# Patient Record
Sex: MALE | Race: BLACK OR AFRICAN AMERICAN | NOT HISPANIC OR LATINO | Employment: UNEMPLOYED | URBAN - METROPOLITAN AREA
[De-identification: names, ages, dates, MRNs, and addresses within clinical notes are randomized per-mention and may not be internally consistent; named-entity substitution may affect disease eponyms.]

---

## 2017-02-14 ENCOUNTER — TELEPHONE (OUTPATIENT)
Dept: TRANSPLANT | Facility: CLINIC | Age: 57
End: 2017-02-14

## 2017-02-15 ENCOUNTER — SOCIAL WORK (OUTPATIENT)
Dept: TRANSPLANT | Facility: CLINIC | Age: 57
End: 2017-02-15

## 2018-05-30 ENCOUNTER — TELEPHONE (OUTPATIENT)
Dept: TRANSPLANT | Facility: CLINIC | Age: 58
End: 2018-05-30

## 2018-05-30 NOTE — TELEPHONE ENCOUNTER
----- Message from Coco Eaton sent at 5/30/2018  4:37 PM CDT -----  We have the pt recorders and they are now pending review by the referral nurse. Also update his demo info  By:Coco Eaton

## 2018-06-01 ENCOUNTER — TELEPHONE (OUTPATIENT)
Dept: TRANSPLANT | Facility: CLINIC | Age: 58
End: 2018-06-01

## 2018-06-01 ENCOUNTER — DOCUMENTATION ONLY (OUTPATIENT)
Dept: TRANSPLANT | Facility: CLINIC | Age: 58
End: 2018-06-01

## 2018-06-01 NOTE — TELEPHONE ENCOUNTER
Initial referral received from Dr Stephen  Pt to be discussed on Monday at [facility] to determine HCV treatment vs TXP vs Med Management

## 2018-06-04 ENCOUNTER — TELEPHONE (OUTPATIENT)
Dept: TRANSPLANT | Facility: CLINIC | Age: 58
End: 2018-06-04

## 2018-06-04 NOTE — TELEPHONE ENCOUNTER
Initial referral received from Dr Stephen    Patient with HEP C Cirrhosis on dialysis with  MELD 21  Referred for LIVER AND KIDNEY  transplant for EVALUATION.    Referral completed and forwarded to [facility].      Insurance:     Medicare  [id number]    Medicaid  [id number]

## 2018-06-07 ENCOUNTER — TELEPHONE (OUTPATIENT)
Dept: TRANSPLANT | Facility: CLINIC | Age: 58
End: 2018-06-07

## 2018-06-07 NOTE — TELEPHONE ENCOUNTER
Call placed again LVM on pt phone number listed and this time his daughter phone [phone]. LVM on need to contact us asap for his appt.

## 2018-06-11 ENCOUNTER — TELEPHONE (OUTPATIENT)
Dept: TRANSPLANT | Facility: CLINIC | Age: 58
End: 2018-06-11

## 2018-06-11 DIAGNOSIS — B18.2 CHRONIC HEPATITIS C WITH CIRRHOSIS: Primary | ICD-10-CM

## 2018-06-11 DIAGNOSIS — K74.60 CHRONIC HEPATITIS C WITH CIRRHOSIS: Primary | ICD-10-CM

## 2018-06-11 DIAGNOSIS — Z76.82 ORGAN TRANSPLANT CANDIDATE: ICD-10-CM

## 2018-06-11 NOTE — TELEPHONE ENCOUNTER
Pt finally returned call re the appt scheduled for tomorrow. He stated he can not make it needing two days notice. And he can't make it MWF.  Message to Dr Fabian re plan for rescheduling. Pt agreed to appt on tuesday next week.

## 2018-06-18 RX ORDER — OMEPRAZOLE 20 MG/1
20 CAPSULE, DELAYED RELEASE ORAL DAILY
COMMUNITY
Start: 2017-12-21 | End: 2018-11-06 | Stop reason: SDUPTHER

## 2018-06-18 RX ORDER — AMLODIPINE BESYLATE 10 MG/1
10 TABLET ORAL DAILY
COMMUNITY
End: 2018-11-06 | Stop reason: SDUPTHER

## 2018-06-18 RX ORDER — CLOTRIMAZOLE AND BETAMETHASONE DIPROPIONATE 10; .64 MG/G; MG/G
CREAM TOPICAL
COMMUNITY
Start: 2018-05-28 | End: 2019-05-28

## 2018-06-18 RX ORDER — SILDENAFIL CITRATE 20 MG/1
10 TABLET ORAL DAILY PRN
COMMUNITY
Start: 2017-12-21 | End: 2018-11-06 | Stop reason: SDUPTHER

## 2018-06-18 RX ORDER — PRAVASTATIN SODIUM 20 MG/1
20 TABLET ORAL DAILY
COMMUNITY
Start: 2018-05-28 | End: 2018-11-06 | Stop reason: SDUPTHER

## 2018-06-18 RX ORDER — SEVELAMER HYDROCHLORIDE 800 MG/1
3200 TABLET, FILM COATED ORAL
COMMUNITY

## 2018-06-18 RX ORDER — ENALAPRIL MALEATE 20 MG/1
20 TABLET ORAL DAILY
COMMUNITY

## 2018-06-19 ENCOUNTER — LAB VISIT (OUTPATIENT)
Dept: LAB | Facility: HOSPITAL | Age: 58
End: 2018-06-19
Attending: INTERNAL MEDICINE
Payer: MEDICARE

## 2018-06-19 ENCOUNTER — OFFICE VISIT (OUTPATIENT)
Dept: TRANSPLANT | Facility: CLINIC | Age: 58
End: 2018-06-19
Attending: INTERNAL MEDICINE
Payer: MEDICARE

## 2018-06-19 VITALS
HEART RATE: 65 BPM | OXYGEN SATURATION: 96 % | TEMPERATURE: 98 F | DIASTOLIC BLOOD PRESSURE: 80 MMHG | SYSTOLIC BLOOD PRESSURE: 150 MMHG | BODY MASS INDEX: 27.73 KG/M2 | WEIGHT: 187.19 LBS | RESPIRATION RATE: 16 BRPM | HEIGHT: 69 IN

## 2018-06-19 DIAGNOSIS — R18.8 OTHER ASCITES: Chronic | ICD-10-CM

## 2018-06-19 DIAGNOSIS — K74.60 CHRONIC HEPATITIS C WITH CIRRHOSIS: ICD-10-CM

## 2018-06-19 DIAGNOSIS — Z76.82 ORGAN TRANSPLANT CANDIDATE: ICD-10-CM

## 2018-06-19 DIAGNOSIS — N18.6 ESRD (END STAGE RENAL DISEASE): Primary | ICD-10-CM

## 2018-06-19 DIAGNOSIS — B18.2 CHRONIC HEPATITIS C WITH CIRRHOSIS: ICD-10-CM

## 2018-06-19 LAB
A1 AG RBC QL: NORMAL
ABO + RH BLD: NORMAL
AFP SERPL-MCNC: 14 NG/ML
ALBUMIN SERPL BCP-MCNC: 2.7 G/DL
ALP SERPL-CCNC: 207 U/L
ALT SERPL W/O P-5'-P-CCNC: 36 U/L
ANION GAP SERPL CALC-SCNC: 11 MMOL/L
AST SERPL-CCNC: 63 U/L
BASOPHILS # BLD AUTO: 0.06 K/UL
BASOPHILS NFR BLD: 1 %
BILIRUB DIRECT SERPL-MCNC: 0.4 MG/DL
BILIRUB SERPL-MCNC: 0.8 MG/DL
BLD GP AB SCN CELLS X3 SERPL QL: NORMAL
BUN SERPL-MCNC: 43 MG/DL
CALCIUM SERPL-MCNC: 7.9 MG/DL
CHLORIDE SERPL-SCNC: 96 MMOL/L
CO2 SERPL-SCNC: 31 MMOL/L
CREAT SERPL-MCNC: 8.9 MG/DL
DIFFERENTIAL METHOD: ABNORMAL
EOSINOPHIL # BLD AUTO: 0.2 K/UL
EOSINOPHIL NFR BLD: 2.6 %
ERYTHROCYTE [DISTWIDTH] IN BLOOD BY AUTOMATED COUNT: 15 %
EST. GFR  (AFRICAN AMERICAN): 6.8 ML/MIN/1.73 M^2
EST. GFR  (NON AFRICAN AMERICAN): 5.9 ML/MIN/1.73 M^2
GGT SERPL-CCNC: 325 U/L
GLUCOSE SERPL-MCNC: 121 MG/DL
HCT VFR BLD AUTO: 33.5 %
HGB BLD-MCNC: 10.6 G/DL
IMM GRANULOCYTES # BLD AUTO: 0.03 K/UL
IMM GRANULOCYTES NFR BLD AUTO: 0.5 %
INR PPP: 1.1
LYMPHOCYTES # BLD AUTO: 0.9 K/UL
LYMPHOCYTES NFR BLD: 15 %
MCH RBC QN AUTO: 32 PG
MCHC RBC AUTO-ENTMCNC: 31.6 G/DL
MCV RBC AUTO: 101 FL
MONOCYTES # BLD AUTO: 0.9 K/UL
MONOCYTES NFR BLD: 15.1 %
NEUTROPHILS # BLD AUTO: 4 K/UL
NEUTROPHILS NFR BLD: 65.8 %
NRBC BLD-RTO: 0 /100 WBC
PLATELET # BLD AUTO: 157 K/UL
PMV BLD AUTO: 10.3 FL
POTASSIUM SERPL-SCNC: 4.4 MMOL/L
PROT SERPL-MCNC: 7.2 G/DL
PROTHROMBIN TIME: 11.6 SEC
RBC # BLD AUTO: 3.31 M/UL
SODIUM SERPL-SCNC: 138 MMOL/L
WBC # BLD AUTO: 6.14 K/UL

## 2018-06-19 PROCEDURE — 85610 PROTHROMBIN TIME: CPT | Mod: TXP

## 2018-06-19 PROCEDURE — 86905 BLOOD TYPING RBC ANTIGENS: CPT | Mod: TXP

## 2018-06-19 PROCEDURE — 36415 COLL VENOUS BLD VENIPUNCTURE: CPT | Mod: TXP

## 2018-06-19 PROCEDURE — 99999 PR PBB SHADOW E&M-EST. PATIENT-LVL III: CPT | Mod: PBBFAC,TXP,, | Performed by: INTERNAL MEDICINE

## 2018-06-19 PROCEDURE — 80053 COMPREHEN METABOLIC PANEL: CPT | Mod: TXP

## 2018-06-19 PROCEDURE — 99205 OFFICE O/P NEW HI 60 MIN: CPT | Mod: S$PBB,TXP,, | Performed by: INTERNAL MEDICINE

## 2018-06-19 PROCEDURE — 82105 ALPHA-FETOPROTEIN SERUM: CPT | Mod: TXP

## 2018-06-19 PROCEDURE — 82248 BILIRUBIN DIRECT: CPT | Mod: TXP

## 2018-06-19 PROCEDURE — 82977 ASSAY OF GGT: CPT | Mod: TXP

## 2018-06-19 PROCEDURE — 80321 ALCOHOLS BIOMARKERS 1OR 2: CPT | Mod: TXP

## 2018-06-19 PROCEDURE — 85025 COMPLETE CBC W/AUTO DIFF WBC: CPT | Mod: TXP

## 2018-06-19 PROCEDURE — 99213 OFFICE O/P EST LOW 20 MIN: CPT | Mod: PBBFAC,TXP,25 | Performed by: INTERNAL MEDICINE

## 2018-06-19 PROCEDURE — 86850 RBC ANTIBODY SCREEN: CPT | Mod: TXP

## 2018-06-19 RX ORDER — ERGOCALCIFEROL 1.25 MG/1
50000 CAPSULE ORAL
COMMUNITY

## 2018-06-19 NOTE — PROGRESS NOTES
Transplant Hepatology  Liver Transplant Recipient Evaluation      Consultation started: 6/19/2018 at 9:41 AM     Original Referring Provider: Luciana Stephen  Current Corresponding Physician: Luciana HERNANDEZ Native Liver Diagnosis: Cirrhosis: Type C    Reason for Visit: evaluation for liver transplant     Subjective:     Corbin Tavarez is a 57 y.o. male with ESLD secondary to chronic hepatitis C and well ESRD due to hypertension.  I had the pleasure of seeing Corbin Tavarez today for consideration of a liver   transplant.  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  He also has end-stage renal disease due to hypertension.  He has   been on dialysis for the past 9 years.  He had also been incarcerated for 21   years and has been out of residential since 2016.  He was treated about two decades   ago with interferon based antiviral therapy, but failed.  He has developed   ascites and esophageal varices.  He has no encephalopathy.  He has not required   paracenteses.  He states that there was an episode of congestive heart failure   that may have been due to volume overload.  He also describes what may have been   a cardiac stent procedure done about six months ago.  He lives at home with his   nephew.  He also states that his son could be a caregiver.      NG/HN  dd: 06/19/2018 09:47:47 (CDT)  td: 06/20/2018 00:46:27 (CDT)  Doc ID   #[id number]  Job ID #[id number]    CC:       Review of Systems   Constitutional: Negative for activity change, appetite change, chills, fatigue and unexpected weight change.   HENT: Negative for congestion, facial swelling and tinnitus.    Eyes: Negative for visual disturbance.   Respiratory: Negative for cough, shortness of breath and wheezing.    Cardiovascular: Positive for leg swelling. Negative for chest pain and palpitations.   Gastrointestinal: Negative for abdominal  distention.   Genitourinary: Negative for dysuria.   Musculoskeletal: Negative for arthralgias, joint swelling and myalgias.   Neurological: Negative for syncope and headaches.   Hematological: Does not bruise/bleed easily.   Psychiatric/Behavioral: Negative for confusion.       Objective:   Physical Exam   Constitutional: He is oriented to person, place, and time. He appears well-developed and well-nourished.   Eyes: No scleral icterus.   Cardiovascular: Normal rate, regular rhythm and normal heart sounds.    Pulmonary/Chest: Effort normal and breath sounds normal. No respiratory distress. He has no wheezes.   Abdominal: Soft. Bowel sounds are normal. He exhibits no distension and no mass. There is no tenderness. There is no rebound.   Musculoskeletal: Normal range of motion.   Lymphadenopathy:     He has no cervical adenopathy.   Neurological: He is alert and oriented to person, place, and time.   Skin: Skin is warm and dry.     Computed MELD-Na score unavailable. Necessary lab results were not found in the last year.  Computed MELD score unavailable. Necessary lab results were not found in the last year.  No results found for: GLU, BUN, CREATININE, CALCIUM, NA, K, CL, PROT, CO2, WBC, RBC, HGB, HCT, PLT  No results found for: BOONE, BNP, CHOL, TRIG, HDL, CHOLHDL, TOTALCHOLEST, ALBUMIN, BILITOT, AST, ALT, ALKPHOS, MG, LABPROT, INR, PSA, QUANTIFERON    Diagnostics:     1. ESRD (end stage renal disease)    2. Chronic hepatitis C with cirrhosis    3. Organ transplant candidate    4. Other ascites        Transplant Hepatology - Candidacy   Assessment/Plan:     Transplant Candidacy: Corbin Tavarez is a 57 y.o. male with ESLD secondary to hepatitis C here for evaluation for possible OLT.  In my opinion, he is a good candidate for liver transplant. He will need to see cardiology for cardiac risk stratification. He will need to be assessed by renal transplant for possible combined liver/kidney transplant. He will be  presented to selection committee after all tests and evaluations are complete. Would recommend deferring possible HCV treatment until after a decision is made about transplantation to allow potential access to wider donor pool.    Rivera Ca MD         [facility] Patient Status  Functional Status: 90% - Able to carry on normal activity: minor symptoms of disease  Physical Capacity: No Limitations    Outside Records Request:

## 2018-06-19 NOTE — LETTER
June 19, 2018        Luciana Stephen  [address]  [address]  [place] [address]  Phone: [phone]  Fax: [phone]             [place] [facility]  [address]  [facility] [address]  Phone: [phone]   Patient: Corbin Tavarez   MR Number: [id number]   YOB: 1960   Date of Visit: 6/19/2018       Dear Dr. Luciana Stephen    Thank you for referring Corbin Taavrez to me for evaluation. Attached you will find relevant portions of my assessment and plan of care.    If you have questions, please do not hesitate to call me. I look forward to following Corbin Tavarez along with you.    Sincerely,    Rivera Ca MD    Enclosure    If you would like to receive this communication electronically, please contact [email or web address] or [phone] to request Jascha Link access.    Jascha Link is a tool which provides read-only access to select patient information with whom you have a relationship. Its easy to use and provides real time access to review your patients record including encounter summaries, notes, results, and demographic information.    If you feel you have received this communication in error or would no longer like to receive these types of communications, please e-mail [email or web address]

## 2018-06-22 LAB — PHOSPHATIDYLETHANOL (PETH): NEGATIVE NG/ML

## 2018-07-17 ENCOUNTER — TELEPHONE (OUTPATIENT)
Dept: TRANSPLANT | Facility: CLINIC | Age: 58
End: 2018-07-17

## 2018-07-17 DIAGNOSIS — B18.2 CHRONIC HEPATITIS C WITH CIRRHOSIS: ICD-10-CM

## 2018-07-17 DIAGNOSIS — Z76.82 ORGAN TRANSPLANT CANDIDATE: Primary | ICD-10-CM

## 2018-07-17 DIAGNOSIS — N18.6 ESRD (END STAGE RENAL DISEASE): ICD-10-CM

## 2018-07-17 DIAGNOSIS — K73.2 CHRONIC ACTIVE HEPATITIS: ICD-10-CM

## 2018-07-17 DIAGNOSIS — R18.8 OTHER ASCITES: Chronic | ICD-10-CM

## 2018-07-17 DIAGNOSIS — K74.60 CHRONIC HEPATITIS C WITH CIRRHOSIS: ICD-10-CM

## 2018-07-17 NOTE — TELEPHONE ENCOUNTER
Pt returned my call. We discussed his need per Dr. Ca for a combined liver & kidney evaluation. Pt would like to schedule a standard evaluation the week of 7/30. This would give him enough time to arrange transportation to main campus for all of his appts. Orders entered for SLK evaluation, plus a cardiology consult per Dr. Ca. Pt states that he had endoscopic procedures at [facility]. Coordinator will reach out to them for records. Understanding expressed. No other questions at this time.

## 2018-07-17 NOTE — PROGRESS NOTES
Orders entered for evaluation. Pt returned call to schedule a standard liver evaluation the week of 7/30.

## 2018-07-23 ENCOUNTER — TELEPHONE (OUTPATIENT)
Dept: TRANSPLANT | Facility: CLINIC | Age: 58
End: 2018-07-23

## 2018-07-23 NOTE — TELEPHONE ENCOUNTER
Returned call to patient. He stated that he received a call from  Friday to notify him of his appointments, but this was not enough advanced notice for him to schedule transportation. He is available next week and the week after. Spoke to Mikhail about rescheduling for 2nd week of August. Pt understands & has no other questions at this time. He is currently in dialysis and available for phone calls.

## 2018-07-23 NOTE — TELEPHONE ENCOUNTER
----- Message from Veronika Mcneill sent at 7/23/2018 10:32 AM CDT -----  Contact: pt  Patient Returning Call from [facility]    Who Left Message for Patient:  Communication Preference: [phone]  Additional Information: n/a

## 2018-07-23 NOTE — TELEPHONE ENCOUNTER
Returned call to patient to reschedule eval. No answer. Left VM requesting call back with requested dates of eval.

## 2018-07-23 NOTE — TELEPHONE ENCOUNTER
----- Message from Coco Eaton sent at 7/23/2018  9:59 AM CDT -----  Pt wants to re/nba work up appts  ----- Message -----  From: Kerry Corral  Sent: 7/23/2018   9:15 AM  To: [facility] Pre-Liver Transplant Non-Clinical    Patient Requesting Sooner Appointment.     Reason for sooner appt.:  When is the first available appointment?  Communication Preference: [phone]  Additional Information: needs to resched to next available appt

## 2018-07-31 ENCOUNTER — HOSPITAL ENCOUNTER (OUTPATIENT)
Dept: RADIOLOGY | Facility: HOSPITAL | Age: 58
Discharge: HOME | End: 2018-07-31
Attending: INTERNAL MEDICINE
Payer: MEDICARE

## 2018-07-31 ENCOUNTER — SOCIAL WORK (OUTPATIENT)
Dept: TRANSPLANT | Facility: CLINIC | Age: 58
End: 2018-07-31
Attending: INTERNAL MEDICINE
Payer: MEDICARE

## 2018-07-31 DIAGNOSIS — Z76.82 ORGAN TRANSPLANT CANDIDATE: ICD-10-CM

## 2018-07-31 DIAGNOSIS — R18.8 OTHER ASCITES: Chronic | ICD-10-CM

## 2018-07-31 DIAGNOSIS — N18.6 ESRD (END STAGE RENAL DISEASE): ICD-10-CM

## 2018-07-31 DIAGNOSIS — K73.2 CHRONIC ACTIVE HEPATITIS: ICD-10-CM

## 2018-07-31 DIAGNOSIS — K74.60 CHRONIC HEPATITIS C WITH CIRRHOSIS: ICD-10-CM

## 2018-07-31 DIAGNOSIS — B18.2 CHRONIC HEPATITIS C WITH CIRRHOSIS: ICD-10-CM

## 2018-07-31 PROCEDURE — 71046 X-RAY EXAM CHEST 2 VIEWS: CPT | Mod: TC,FY,TXP

## 2018-07-31 PROCEDURE — 71046 X-RAY EXAM CHEST 2 VIEWS: CPT | Mod: 26,TXP,, | Performed by: RADIOLOGY

## 2018-07-31 NOTE — PROGRESS NOTES
Transplant Recipient Adult Psychosocial Assessment\    Patient did not present to this appointment with caregivers. Patient was informed that he would need to present for another scheduled appointment with caregivers.     Corbin Daysi  [address] [address]    Telephone Information:   Mobile [phone]   Mobile [phone]   Home  [phone] (home)  Work  There is no work phone number on file.  E-mail  No e-mail address on record    Sex: male  YOB: 1960  Age: 57 y.o.    Encounter Date: 2018  [place] Citizen: yes  Primary Language: English   Needed: no    Emergency Contact:  Name: Moncho Nicholas (36 yrs old)  Relationship: daughter  Address: exact address unknown;  Daughter lives in [place], [place]  Phone Numbers:  [phone] (mobile)    Family/Social Support:   Number of dependents/: none  Marital history: patient reportedly still legally , however patient never . Patient  in   Other family dynamics: Patient parents are . Patient reported that he gets along well with the mothers of his children. Patient reported having eight different mothers of his 16 children.       Household Composition:  Name: Jimbo Valiente   Age: 53  Relationship: friend  Does person drive? no     Patient reported that Jimbo Valiente is the patient's roommate. Per patient, Jimbo is a heavy substance abuser that he lives with. Patient reported that he has created a nice environment for him and his roommate. The patient reports that he sleeps in the living room of the apartment. Patient is working on trying to find is own place due to safety concerns regarding his roommate's drug use. Pt reported that he is concerned about the strangers that present to the home to use.       Do you and your caregivers have access to reliable transportation? Patient does not have transportation, but reported that his possible caregivers have transportation. The  "patient utilizes Medicaid transportation.     PRIMARY CAREGIVER: Gavin Nicholas, pt's son, will be primary caregiver, phone number [phone]. Patient reported that his son "let him down" when he was suppose to present to [facility] for patient to receive an evaluation for transplant. Patient reported that his son did not show and he was unable to be evaluated. Patient reported that this will be his son's second chance. SW spoke to patient regarding choosing caregivers that will be reliable.      provided in-depth information to patient and caregiver regarding pre- and post-transplant caregiver role.   strongly encourages patient and caregiver to have concrete plan regarding post-transplant care giving, including back-up caregiver(s) to ensure care giving needs are met as needed.    Patient states understanding all aspects of caregiver role/commitment and is able/willing/committed to being caregiver to the fullest extent necessary.    Patient verbalizes understanding of the education provided today and caregiver responsibilities.         remains available. Patient agree to contact  in a timely manner if concerns arise.        Able to take time off work without financial concerns: yes. Patient reported that his caregivers are currently unemployed.    Additional Significant Others who will Assist with Transplant:  Name: Roland Valiente   Age: 61  City: [place] State: [place]  Relationship: friend  Does person drive? yes      Living Will: no  Healthcare Power of : no  Advance Directives on file: <<no information> per medical record.  Verbally reviewed LW/HCPA information.   provided patient with copy of LW/HCPA documents and provided education on completion of forms.    Living Donors: N/A    Highest Education Level: High School (9-12) or GED  Reading Ability: 12th grade  Reports difficulty with: N/A  Learns Best By:  Demonstration and " written     Status: no  VA Benefits: no     Working for Income: No  If no, reason not working: Disability  Patient is disabled.  Prior to disability, patient  was employed at [employer] for about a year prior to being incarcerated. .    Spouse/Significant Other Employment: N/A    Disabled: yes: date disability began: , due to: ESRD and ESLD.    Monthly Income:  SSI: $750; Food stamps: $90  Able to afford all costs now and if transplanted, including medications: yes  Patient verbalizes understanding of personal responsibilities related to transplant costs and the importance of having a financial plan to ensure that patients transplant costs are fully covered.      provided fundraising information/education.  Patient verbalizes understanding.   remains available.    Insurance:   Payor/Plan Subscr  Sex Relation Sub. Ins. ID Effective Group Num   1. MEDICARE - [place]* DEANDRE ONOFRE 1960 Male  [id number] 11/1/15                                    [address]   2. [place] DE[place] ONOFRE 1960 Male  [id number]* 16                                    [address] [address]     Primary Insurance (for UNOS reporting): Public Insurance - Medicare FFS (Fee For Service)  Secondary Insurance (for UNOS reporting): Public Insurance - Medicaid  Patient verbalizes clear understanding that patient may experience difficulty obtaining and/or be denied insurance coverage post-surgery. This includes and is not limited to disability insurance, life insurance, health insurance, burial insurance, long term care insurance, and other insurances.    Patient also reports understanding that future health concerns related to or unrelated to transplantation may not be covered by patient's insurance.  Resources and information provided and reviewed.      Patient provides verbal permission to release any necessary information to outside resources for patient care and discharge planning.   Resources and information provided are reviewed.      Dialysis Adherence:  Patient reports that he attends dialysis MWF for 10:30am at [facility] in [place]. Patient reported that he currently uses Medicaid transportation to get to his appointments. Patient reported that he initially was noncompliant with treatment due to being homeless. Patient reported difficulty getting from local shelter to dialysis clinic. Patient reported that now that he has income, a home, and Medicaid transportation he is able to make all appointments.   Infusion Service: patient utilizing? no  Home Health: patient utilizing? no  DME: no  Pulmonary/Cardiac Rehab: none  ADLS:  Patient reported no current difficulties with ADLS. Per patient, at times he gets SOB.     Adherence:   Patient reported that he is compliant with medication regimen and recommendations.  Adherence education and counseling provided.     Per History Section:  Past Medical History:   Diagnosis Date    CHF (congestive heart failure)     Cirrhosis     Diabetes mellitus     Disorder of kidney and ureter     ESRD    GERD (gastroesophageal reflux disease)      Social History   Substance Use Topics    Smoking status: Former Smoker     Types: Cigarettes    Smokeless tobacco: Not on file      Comment: stopped smoking about 7 months ago    Alcohol use No      Comment: patient denies     History   Drug Use No     Comment: patient denies     History   Sexual Activity    Sexual activity: Not on file       Per Today's Psychosocial:  Tobacco: Patient reported that he no longer smokes. Per patient, his last cigarette use was Feb 2018 due to receiving his ESLD diagnosis. Patient reported that he was informed to smoking at that time. Per patient he initially smoked a pack of cigarettes a day. While incarcerated the patient mentioned he smoked a pack every three days.     Alcohol: none, patient denies any use.    Illicit Drugs/Non-prescribed Medications: Patient reported a past use  "of marijuana. Patient last use was reportedly in 1988. Patient stated that used marijuana "all day and everyday" from 1310-2503. Patient reported smoking the equivalent of about 20 joints. Patient also endorsed using cocaine from 1952-8425. Patient stated that he used about 1/2 ounce daily.     Patient states clear understanding of the potential impact of substance use as it relates to transplant candidacy and is aware of possible random substance screening.  Substance abstinence/cessation counseling, education and resources provided and reviewed.     Arrests/DWI/Treatment/Rehab: yes. Patient reported that he was arrested in 1995 for armed robbery. Per patient he completed 21 years in [facility]. Patient reported that he was arrested prior to 1995 for multiple misdemeanors.     Psychiatric History:    Mental Health: Patient denies.   Psychiatrist/Counselor: none  Medications:  none  Suicide/Homicide Issues: Patient denies SI/HI  Safety at home: Per patient, Jimbo, his roommate, is a heavy substance abuser that he lives with. Patient reported that he has created a nice environment for him and his roommate. The patient reports that he sleeps in the living room of the apartment. Patient is working on trying to find is own place due to safety concerns regarding his roommate's drug use. Pt reported that he is concerned about the strangers that present to the home to use.    Knowledge: Patient states having clear understanding and realistic expectations regarding the potential risks and potential benefits of organ transplantation and organ donation, agrees to discuss with health care team members and support system members and to utilize available resources and express questions and/or concerns in order to further facilitate the pt informed decision-making.  Resources and information provided and reviewed.     Patient is aware of [facility]'s affiliation and/or partnership with agencies in home health care, LTAC, SNF, DME, and " other hospitals and clinics.    Understanding: Patient reports having a clear understanding of the many lifetime commitments involved with being a transplant recipient, including costs, compliance, medications, lab work, procedures, appointments, concrete and financial planning, preparedness, timely and appropriate communication of concerns, abstinence (ETOH, tobacco, illicit non-prescribed drugs), adherence to all health care team recommendations, support system and caregiver involvement, appropriate and timely resource utilization and follow-through, mental health counseling as needed/recommended, and patient and caregiver responsibilities.  Social Service Handbook, resources and detailed educational information provided and reviewed.  Educational information provided.    Patient also reports current and expected compliance with health care regime and states having a clear understanding of the importance of compliance.      Patient reports a clear understanding that risks and benefits may be involved with organ transplantation and with organ donation.      Patient also reports clear understanding that psychosocial risk factors may affect patient, and include but are not limited to feelings of depression, generalized anxiety, anxiety regarding dependence on others, post traumatic stress disorder, feelings of guilt and other emotional and/or mental concerns, and/or exacerbation of existing mental health concerns.  Detailed resources provided and discussed.     Patient agrees to access appropriate resources in a timely manner as needed and/or as recommended, and to communicate concerns appropriately.  Patient also reports a clear understanding of treatment options available.      reviewed education, provided additional information, and answered questions.    Feelings or Concerns: Patient reported that he is excited about the possibility of being transplanted. Per patient, he is interested in being a  "transplant success story. Patient reported that he wants to make family and friends proud.     Coping: Patient reported adequate coping. Patient reported he feels sadden about needing a transplant at times. Patient stated that prefers to be alone when he has those feelings. Patient stated he also talks to his family and friends.     Goals: Patient reported that he is interested in becoming healthy again and making changes in his lifestyle.     Interview Behavior: Patient presents as alert and oriented x 4, pleasant, good eye contact, well groomed, recall good, concentration/judgement good, average intelligence, calm, communicative, cooperative and asking and answering questions appropriately.          Transplant Social Work - Candidacy  Assessment/Plan:     Psychosocial Suitability: Patient presents as an unacceptable candidate for kidney transplant or liver transplant at this time. Based on psychosocial risk factors, patient presents as high risk, due to not having caregivers present at his scheduled appointment. In addition, the patient reported that he is interested in his son being a primary caregiver. This is concerning giving that the patient mentioned that his son "let him down" when he was suppose to present to [facility] for patient to receive an evaluation for transplant. Patient reported that his son did not show and he was unable to be evaluated. Patient reported that this will be his son's second chance. SW spoke to patient regarding choosing caregivers that will be reliable. Protective factors include, adequate health insurance, and no current substance abuse and mental health issues.  Furthermore, patient lives in a home that he reported is currently not safe. As previously stated, the patient reported that his roommate is a substance abuser and often brings strangers home to binge on substances. Patient reported that he is interested in looking for his own place, and does not want to burden his " family. Patient reported that prior to living with his current roommate he was homeless following his release from longterm.      Recommendations/Additional Comments:   -fundraising  -safe and appropriate housing  -appointment to assess caregivers       Enzo Carranza LMSW

## 2018-08-01 ENCOUNTER — TELEPHONE (OUTPATIENT)
Dept: TRANSPLANT | Facility: CLINIC | Age: 58
End: 2018-08-01

## 2018-08-01 NOTE — TELEPHONE ENCOUNTER
----- Message from Madalyn Hernandez sent at 8/1/2018  2:59 PM CDT -----  Contact: Nancy from immun/flow  Needs Advice    Reason for call:    Nancy wanted to notify that lab results were put in wrong but she is correcting it   Communication Preference:[phone]  Additional Information:n/a

## 2018-08-07 ENCOUNTER — HOSPITAL ENCOUNTER (OUTPATIENT)
Dept: CARDIOLOGY | Facility: CLINIC | Age: 58
Discharge: HOME | End: 2018-08-07
Attending: INTERNAL MEDICINE
Payer: MEDICARE

## 2018-08-07 ENCOUNTER — LAB VISIT (OUTPATIENT)
Dept: LAB | Facility: HOSPITAL | Age: 58
End: 2018-08-07
Attending: INTERNAL MEDICINE
Payer: MEDICARE

## 2018-08-07 ENCOUNTER — NUTRITION (OUTPATIENT)
Dept: TRANSPLANT | Facility: CLINIC | Age: 58
End: 2018-08-07
Attending: INTERNAL MEDICINE
Payer: MEDICARE

## 2018-08-07 VITALS — BODY MASS INDEX: 26.97 KG/M2 | HEIGHT: 69 IN | WEIGHT: 182.13 LBS

## 2018-08-07 DIAGNOSIS — I35.9 NONRHEUMATIC AORTIC VALVE DISORDER: ICD-10-CM

## 2018-08-07 DIAGNOSIS — Z01.818 PRE-TRANSPLANT EVALUATION FOR LIVER TRANSPLANT: ICD-10-CM

## 2018-08-07 DIAGNOSIS — K74.60 CHRONIC HEPATITIS C WITH CIRRHOSIS: ICD-10-CM

## 2018-08-07 DIAGNOSIS — B18.2 CHRONIC HEPATITIS C WITH CIRRHOSIS: ICD-10-CM

## 2018-08-07 DIAGNOSIS — Z01.818 ENCOUNTER FOR PRE-TRANSPLANT EVALUATION FOR KIDNEY TRANSPLANT: ICD-10-CM

## 2018-08-07 DIAGNOSIS — N18.6 ESRD (END STAGE RENAL DISEASE): Primary | ICD-10-CM

## 2018-08-07 DIAGNOSIS — R18.8 OTHER ASCITES: ICD-10-CM

## 2018-08-07 DIAGNOSIS — K73.2 CHRONIC ACTIVE HEPATITIS: ICD-10-CM

## 2018-08-07 DIAGNOSIS — N18.6 ESRD (END STAGE RENAL DISEASE): ICD-10-CM

## 2018-08-07 DIAGNOSIS — Z76.82 ORGAN TRANSPLANT CANDIDATE: ICD-10-CM

## 2018-08-07 DIAGNOSIS — R18.8 OTHER ASCITES: Chronic | ICD-10-CM

## 2018-08-07 LAB
ABO + RH BLD: NORMAL
BLD GP AB SCN CELLS X3 SERPL QL: NORMAL
DIASTOLIC DYSFUNCTION: YES
ESTIMATED PA SYSTOLIC PRESSURE: 65.73
MITRAL VALVE REGURGITATION: ABNORMAL
RETIRED EF AND QEF - SEE NOTES: 68 (ref 55–65)
TRICUSPID VALVE REGURGITATION: ABNORMAL

## 2018-08-07 PROCEDURE — 86850 RBC ANTIBODY SCREEN: CPT | Mod: TXP

## 2018-08-07 PROCEDURE — 93325 DOPPLER ECHO COLOR FLOW MAPG: CPT | Mod: PBBFAC,TXP | Performed by: INTERNAL MEDICINE

## 2018-08-07 PROCEDURE — 93351 STRESS TTE COMPLETE: CPT | Mod: 26,S$PBB,TXP, | Performed by: INTERNAL MEDICINE

## 2018-08-07 PROCEDURE — 99999 PR PBB SHADOW E&M-EST. PATIENT-LVL I: CPT | Mod: PBBFAC,TXP,, | Performed by: DIETITIAN, REGISTERED

## 2018-08-07 PROCEDURE — 36415 COLL VENOUS BLD VENIPUNCTURE: CPT | Mod: TXP

## 2018-08-07 PROCEDURE — 99211 OFF/OP EST MAY X REQ PHY/QHP: CPT | Mod: PBBFAC,TXP,25 | Performed by: DIETITIAN, REGISTERED

## 2018-08-07 PROCEDURE — 97802 MEDICAL NUTRITION INDIV IN: CPT | Mod: PBBFAC,TXP | Performed by: DIETITIAN, REGISTERED

## 2018-08-07 PROCEDURE — 93320 DOPPLER ECHO COMPLETE: CPT | Mod: 26,S$PBB,TXP, | Performed by: INTERNAL MEDICINE

## 2018-08-07 NOTE — PROGRESS NOTES
"TRANSPLANT NUTRITIONAL ASSESSMENT    Referring Provider: Rivera Ca MD    Reason for Visit: Pre-kidney transplant work-up (on dialysis) and Pre-liver transplant work-up    Age: 57 y.o.  Sex: male    Patient Active Problem List   Diagnosis    Chronic hepatitis C with cirrhosis    ESRD (end stage renal disease)    Other ascites     Past Medical History:   Diagnosis Date    CHF (congestive heart failure)     Cirrhosis     Diabetes mellitus     Disorder of kidney and ureter     ESRD    GERD (gastroesophageal reflux disease)      Lab Results   Component Value Date     (H) 07/31/2018    K 3.5 07/31/2018    ALBUMIN 3.1 (L) 07/31/2018    HGBA1C 6.0 (H) 07/31/2018    CALCIUM 8.1 (L) 07/31/2018     Other Pertinent Labs: none    Current Outpatient Prescriptions   Medication Sig    amLODIPine (NORVASC) 10 MG tablet Take 10 mg by mouth once daily.     clotrimazole-betamethasone 1-0.05% (LOTRISONE) cream Apply between affected toes twice daily    enalapril (VASOTEC) 20 MG tablet Take 20 mg by mouth once daily.     ergocalciferol (VITAMIN D2) 50,000 unit Cap Take 50,000 Units by mouth every 7 days.    omeprazole (PRILOSEC) 20 MG capsule Take 20 mg by mouth once daily.     pravastatin (PRAVACHOL) 20 MG tablet Take 20 mg by mouth once daily.     protein supplement Pack Take by mouth 3 (three) times daily.     sevelamer HCl (RENAGEL) 800 MG Tab Take 3,200 mg by mouth 3 (three) times daily with meals.     sildenafil (REVATIO) 20 mg Tab Take 10 mg by mouth daily as needed.      No current facility-administered medications for this visit.      Allergies: Patient has no known allergies.    Ht Readings from Last 1 Encounters:   08/07/18 5' 9" (1.753 m)     Wt Readings from Last 1 Encounters:   08/07/18 82.6 kg (182 lb 1.6 oz)      BMI: Body mass index is 26.89 kg/m².    Usual Weight: 80-86 kg  Weight Change/Time: fluctuates with HD and fluid retention  Current Diet: Regular  Appetite/Current Intake: good " "  Exercise/Physical Activity: functional in ADL's, increasing weakness noted, no assistance   Nutritional/Herbal Supplements: Boost 3 x/day  Chewing/Swallowing Problems: none  Symptoms: none    Estimated Kcal Need: 3781-6673 kcal (25-30 kcal/kg)  Estimated Protein Need: 83 gm (1 gm/kg)    Nutritional History:   Pt present alone today for eval visit. Pt reports appetite is very good, no n/v/d/abd pain. Pt drinks Boost supplement 3 /day, between meals. Pt goes to HD 3/x week, tolerating. Pt recalls meeting with RD at HD and going over lab report as needed there. Pt takes binders as directed, states K or Phos fluctuate between normal, occasional high or low levels. Pt does his grocery shopping and meal prep at home. Pt states nephew lives with him. Pt provided the following diet recall:  B: grits w/ 2 eggs, 2 us, cheese or oatmeal w/ "a little" sugar, water  L: (home or takes to HD), bologna sandwich or red beans & rice or fried chicken,  [employer] (burger & fries, Coke) or [employer]'s chicken  Snack: Boost supplement  D: beans & rice, spaghetti & meat sauce, casserole, canned cream style corn, snap beans, makes enough food to last a few days, out to eat 1 /week for dinner, not adding salt in cooking, water  Snack: Boost, fresh fruit (peaches, grapes, oranges, bananas, strawberries), snickers bar       Nutritional Diagnoses  Problem: limited adherence to nutrition-related recommendations  Etiology: r/t prior education given on low sodium, renal diet  Symptoms: aeb diet recall including high sodium, high phos/K foods    Educational Need? yes  Barriers: none identified  Discussed with: patient  Interventions: Patient taught nutrition information regarding Pre-kidney transplant work-up (on dialysis) and Pre-liver transplant work-up.    Reviewed Low Sodium packet (low Na diet, foods recommended/not recommended, food label strategies, flavoring tips, & sample menu).   Encouraged continue Boost intake 3 " /day.  Goals/Recommendations: consumption of lay nutritional supplements, take phosphorus binders before all meals and snacks or as prescribed and choose healthy options when dining out  Actions Taken: instruct/provide written information  Patient and/or family comprehend instructions: yes  Outcome: Verbalizes understanding  Monitoring: Follow up in clinic if needed. RD contact info provided.     Counseling Time: 30 minutes

## 2018-08-08 DIAGNOSIS — Z76.82 ORGAN TRANSPLANT CANDIDATE: ICD-10-CM

## 2018-08-08 DIAGNOSIS — B18.2 CHRONIC HEPATITIS C WITH CIRRHOSIS: Primary | ICD-10-CM

## 2018-08-08 DIAGNOSIS — K74.60 CHRONIC HEPATITIS C WITH CIRRHOSIS: Primary | ICD-10-CM

## 2018-08-09 ENCOUNTER — EVALUATION (OUTPATIENT)
Dept: TRANSPLANT | Facility: CLINIC | Age: 58
End: 2018-08-09
Attending: INTERNAL MEDICINE
Payer: MEDICARE

## 2018-08-09 ENCOUNTER — HOSPITAL ENCOUNTER (OUTPATIENT)
Dept: RADIOLOGY | Facility: HOSPITAL | Age: 58
Discharge: HOME | End: 2018-08-09
Attending: INTERNAL MEDICINE
Payer: MEDICARE

## 2018-08-09 ENCOUNTER — TELEPHONE (OUTPATIENT)
Dept: TRANSPLANT | Facility: CLINIC | Age: 58
End: 2018-08-09

## 2018-08-09 ENCOUNTER — HOSPITAL ENCOUNTER (OUTPATIENT)
Dept: RADIOLOGY | Facility: CLINIC | Age: 58
Discharge: HOME | End: 2018-08-09
Attending: INTERNAL MEDICINE
Payer: MEDICARE

## 2018-08-09 VITALS
OXYGEN SATURATION: 95 % | DIASTOLIC BLOOD PRESSURE: 59 MMHG | WEIGHT: 181.88 LBS | SYSTOLIC BLOOD PRESSURE: 106 MMHG | BODY MASS INDEX: 26.86 KG/M2 | TEMPERATURE: 99 F | HEART RATE: 62 BPM | RESPIRATION RATE: 18 BRPM

## 2018-08-09 DIAGNOSIS — K73.2 CHRONIC ACTIVE HEPATITIS: ICD-10-CM

## 2018-08-09 DIAGNOSIS — R18.8 OTHER ASCITES: Chronic | ICD-10-CM

## 2018-08-09 DIAGNOSIS — B18.2 CHRONIC HEPATITIS C WITH CIRRHOSIS: ICD-10-CM

## 2018-08-09 DIAGNOSIS — K74.60 CHRONIC HEPATITIS C WITH CIRRHOSIS: ICD-10-CM

## 2018-08-09 DIAGNOSIS — N18.6 ESRD (END STAGE RENAL DISEASE): ICD-10-CM

## 2018-08-09 DIAGNOSIS — Z76.82 ORGAN TRANSPLANT CANDIDATE: ICD-10-CM

## 2018-08-09 DIAGNOSIS — N18.6 ESRD (END STAGE RENAL DISEASE): Primary | ICD-10-CM

## 2018-08-09 PROCEDURE — 77080 DXA BONE DENSITY AXIAL: CPT | Mod: TC,NTX

## 2018-08-09 PROCEDURE — 93978 VASCULAR STUDY: CPT | Mod: 26,NTX,, | Performed by: RADIOLOGY

## 2018-08-09 PROCEDURE — 99204 OFFICE O/P NEW MOD 45 MIN: CPT | Mod: S$PBB,TXP,, | Performed by: TRANSPLANT SURGERY

## 2018-08-09 PROCEDURE — 93978 VASCULAR STUDY: CPT | Mod: TC,TXP

## 2018-08-09 PROCEDURE — 76770 US EXAM ABDO BACK WALL COMP: CPT | Mod: 26,59,NTX, | Performed by: RADIOLOGY

## 2018-08-09 PROCEDURE — 77080 DXA BONE DENSITY AXIAL: CPT | Mod: 26,TXP,, | Performed by: INTERNAL MEDICINE

## 2018-08-09 PROCEDURE — 99999 PR PBB SHADOW E&M-EST. PATIENT-LVL III: CPT | Mod: PBBFAC,TXP,,

## 2018-08-09 PROCEDURE — 76770 US EXAM ABDO BACK WALL COMP: CPT | Mod: TC,NTX

## 2018-08-09 PROCEDURE — 99213 OFFICE O/P EST LOW 20 MIN: CPT | Mod: PBBFAC,25,TXP

## 2018-08-09 NOTE — PROGRESS NOTES
Transplant Surgery  Liver Transplant Recipient Evaluation    Referring Provider: Luciana Stephen    Subjective:     Reason for Visit: evaluation for liver transplant    History of Present Illness: Corbin Tavarez is a 57 y.o. male who is being evaluated for liver transplant due to Cirrhosis: Type C. Corbin reports ascites (diuretic-dependent), edema, encephalopathy, esophageal or gastric varices and fatigue.    Review of Systems   Constitutional: Negative for activity change, appetite change, chills, diaphoresis, fatigue, fever and unexpected weight change.   HENT: Negative for congestion, dental problem, ear pain, facial swelling, mouth sores, nosebleeds, sore throat, tinnitus, trouble swallowing and voice change.    Eyes: Negative for photophobia, pain and visual disturbance.   Respiratory: Negative for apnea, cough, choking, chest tightness and shortness of breath.    Cardiovascular: Negative for chest pain, palpitations and leg swelling.   Gastrointestinal: Positive for abdominal distention. Negative for abdominal pain, blood in stool, constipation, diarrhea, nausea and vomiting.   Endocrine: Negative for cold intolerance and heat intolerance.   Genitourinary: Negative for difficulty urinating, dysuria, flank pain, hematuria and urgency.   Musculoskeletal: Negative for arthralgias and gait problem.   Skin: Negative for color change, pallor and rash.   Neurological: Negative for dizziness, tremors, seizures, syncope and light-headedness.   Hematological: Negative for adenopathy. Does not bruise/bleed easily.   Psychiatric/Behavioral: Negative for agitation and confusion.       Objective:     Physical Exam   Constitutional: He is oriented to person, place, and time. He appears well-developed and well-nourished. No distress.   HENT:   Head: Normocephalic and atraumatic.   Mouth/Throat: Oropharynx is clear and moist. No oropharyngeal exudate.   Eyes: Conjunctivae are normal. Pupils are equal, round, and  reactive to light. No scleral icterus.   Neck: Normal range of motion. Neck supple.   Cardiovascular: Normal rate, regular rhythm and normal heart sounds.    Pulmonary/Chest: Effort normal and breath sounds normal. No respiratory distress.   Abdominal: Soft. Bowel sounds are normal. He exhibits no distension. There is no tenderness. There is no rebound and no guarding.   Musculoskeletal: Normal range of motion. He exhibits no edema.   Lymphadenopathy:     He has no cervical adenopathy.   Neurological: He is alert and oriented to person, place, and time.   Skin: Skin is warm and dry. No rash noted. No erythema.   Psychiatric: He has a normal mood and affect. His behavior is normal.       MELD-Na score: 21 at 2018  8:30 AM  MELD score: 21 at 2018  8:30 AM  Calculated from:  Serum Creatinine: 0  Serum Sodium: 139 mmol/L (Rounded to 137) at 2018  8:30 AM  Total Bilirubin: 1.1 mg/dL at 2018  8:30 AM  INR(ratio): 1.1 at 2018  8:30 AM  Age: 57 years    Diagnoses:  No diagnosis found.    Transplant Surgery - Candidacy   Assessment/Plan:     Transplant Candidacy: Corbin Tavarez is a 57 y.o. male with ESLD secondary to Cirrhosis: Type C here for evaluation for possible OLT.  Based on available information, Corbin is a suitable liver/kidney transplant candidate.  He will be presented to selection committee after all tests and evaluations are complete.    Yasmine Porter MD       [facility] Patient Status  Functional Status: 50% - Requires considerable assistance and frequent medical care  Physical Capacity: No Limitations    Counseling: I discussed with Corbin the benefits of liver transplantation.  We discussed the evaluation and listing procedures.  We discussed the MELD system and the associated waiting times.  We discussed national and center specific survival results.  We discussed the option of being multiply listed in different OPOs.  We discussed the option of living donation versus  donor  transplantation and the advantages and relative disadvantages of each.   We discussed the risks, benefits and potential complications related to surgery including the risks related to anesthesia, bleeding, infection, primary non-function of the allograft, the risk of reoperation as well as the risk of death.  We discussed the typical post-operative course, length of hospitalization, the long-term use of immunosuppressive therapy as well as the need for long-term routine followup.    PHS: I discussed the use of organs from donors with PHS increased-risk behavior, including the testing protocols utilized, as well as data from the literature regarding the likelihood of transmission of hepatitis or HIV.  The patient is willing to consider such grafts.  DCD: I discussed the use of organs recovered by donation after cardiac death (DCD), including slightly decreased graft survival and greater risk of arterial and biliary complications. The potential advantage to the recipient is possibly receiving a transplant sooner by accepting such an organ. The patient is willing to consider such grafts.  HBcAb: I discussed the use of organs from donors with HBcAb in conjunction with long term use of HBV antiviral drugs, such as lamivudine. The small but measurable risk of hepatitis B seroconversion was discussed as well as the potentially life long need to continue antiviral drugs. The patient is willing to consider such grafts.  HCV Viremic recipient: I discussed the use of HCV-positive organs in recipients who already have HCV, including the outcomes that are not demonstrably different from HCV-positive recipients receiving HCV-negative organs. The potential advantage to the recipient is possibly receiving a transplant sooner by accepting such an organ.  The patient is willing to consider such grafts.   LDLT: I discussed the nature of living donor liver transplant, including donor risks and more frequent recipient complications. The  patient is willing to consider such grafts.

## 2018-08-09 NOTE — TELEPHONE ENCOUNTER
SW received a phone call from patient today in regards to his caregiver.   It was not convenient for patient's caregiver to come with him today to his appt, as [facility]   Pt asking SW to call caregiver as he is home right now.   SW asking patient for caregiver to call the transplant SW about scheduling a follow-up appt with them and to go over caregiver role.  SW provided pt with contact information for transplant SW today.   SW Remains available. For all transplant resources, education and psychosocial support.

## 2018-08-09 NOTE — PROGRESS NOTES
PRE EDUCATION TEACHING NOTE    Corbin Tavarez was seen in clinic today.  Handbook on pre-liver transplant information (see outline below) was given to the patient and time was allowed for questions.  Informed consent signed and written information given on selection criteria.      LIVER TRANSPLANT WORK-UP EDUCATION  I. NATIONAL REGISTRY LISTING  A. Information for listing  B. Regions  C. Per UNOS, can be listed at more than one center  II. SURGERY  A. Length  B. Complications: bleeding, infection  C. Central lines, drains, Sapp catheter, incision, endotracheal tube, NG tube  D. Transfusions, cell saver  III. SHORT TERM RECOVERY  A. ICU, PICU, Hospital stay  IV. LONG TERM RECOVERY  A. Labs at home  B. Clinic visits  C. Complications: infection, rejection, readmissions  D. Normal immunity and immunosuppression  E. Incidence of re-admit in 1st year  V. REJECTION  A. Incidence  B. Treatment: Solumedrol, Prograf, Thymoglobulin (actions and side effects)  VI. IMMUNOSUPPRESSIVES  A. Prednisone  B. Imuran/Cellcept  C. Cyclosporin/Prograf  D. Rapamune  E. Need for lifetime compliance  F. Actions and side effects  G. Costs  VII. RECURRENCE OF VIRAL HEPATITIS

## 2018-08-14 ENCOUNTER — INITIAL CONSULT (OUTPATIENT)
Dept: TRANSPLANT | Facility: CLINIC | Age: 58
End: 2018-08-14
Attending: INTERNAL MEDICINE
Payer: MEDICARE

## 2018-08-14 ENCOUNTER — HOSPITAL ENCOUNTER (OUTPATIENT)
Dept: RADIOLOGY | Facility: HOSPITAL | Age: 58
Discharge: HOME | End: 2018-08-14
Attending: INTERNAL MEDICINE
Payer: MEDICARE

## 2018-08-14 ENCOUNTER — SOCIAL WORK (OUTPATIENT)
Dept: TRANSPLANT | Facility: CLINIC | Age: 58
End: 2018-08-14
Payer: MEDICARE

## 2018-08-14 VITALS
DIASTOLIC BLOOD PRESSURE: 57 MMHG | SYSTOLIC BLOOD PRESSURE: 115 MMHG | HEART RATE: 61 BPM | HEIGHT: 69 IN | WEIGHT: 190.69 LBS | BODY MASS INDEX: 28.24 KG/M2

## 2018-08-14 DIAGNOSIS — K73.2 CHRONIC ACTIVE HEPATITIS: ICD-10-CM

## 2018-08-14 DIAGNOSIS — Z76.82 ORGAN TRANSPLANT CANDIDATE: ICD-10-CM

## 2018-08-14 DIAGNOSIS — Z01.818 ENCOUNTER FOR PRE-TRANSPLANT EVALUATION FOR LIVER TRANSPLANT: ICD-10-CM

## 2018-08-14 DIAGNOSIS — I15.1 HYPERTENSION SECONDARY TO OTHER RENAL DISORDERS: ICD-10-CM

## 2018-08-14 DIAGNOSIS — B18.2 CHRONIC HEPATITIS C WITH CIRRHOSIS: ICD-10-CM

## 2018-08-14 DIAGNOSIS — I27.20 PULMONARY HYPERTENSION: ICD-10-CM

## 2018-08-14 DIAGNOSIS — N18.6 ESRD (END STAGE RENAL DISEASE): ICD-10-CM

## 2018-08-14 DIAGNOSIS — K74.60 CHRONIC HEPATITIS C WITH CIRRHOSIS: ICD-10-CM

## 2018-08-14 DIAGNOSIS — I50.30 (HFPEF) HEART FAILURE WITH PRESERVED EJECTION FRACTION: Primary | ICD-10-CM

## 2018-08-14 DIAGNOSIS — Z01.818 ENCOUNTER FOR PRE-TRANSPLANT EVALUATION FOR KIDNEY TRANSPLANT: ICD-10-CM

## 2018-08-14 DIAGNOSIS — R18.8 OTHER ASCITES: Chronic | ICD-10-CM

## 2018-08-14 PROBLEM — E78.5 HYPERLIPIDEMIA: Status: ACTIVE | Noted: 2018-05-28

## 2018-08-14 PROBLEM — D53.9 MACROCYTIC ANEMIA: Status: ACTIVE | Noted: 2017-10-12

## 2018-08-14 PROBLEM — K20.80 ESOPHAGITIS, LOS ANGELES GRADE C: Status: ACTIVE | Noted: 2017-12-20

## 2018-08-14 PROBLEM — I10 ESSENTIAL HYPERTENSION: Status: ACTIVE | Noted: 2017-11-08

## 2018-08-14 PROBLEM — K21.00 GASTROESOPHAGEAL REFLUX DISEASE WITH ESOPHAGITIS: Status: ACTIVE | Noted: 2017-12-21

## 2018-08-14 PROCEDURE — 74178 CT ABD&PLV WO CNTR FLWD CNTR: CPT | Mod: TC,TXP

## 2018-08-14 PROCEDURE — 99204 OFFICE O/P NEW MOD 45 MIN: CPT | Mod: S$PBB,TXP,, | Performed by: INTERNAL MEDICINE

## 2018-08-14 PROCEDURE — 99999 PR PBB SHADOW E&M-EST. PATIENT-LVL III: CPT | Mod: PBBFAC,TXP,, | Performed by: INTERNAL MEDICINE

## 2018-08-14 PROCEDURE — 25500020 PHARM REV CODE 255: Mod: TXP | Performed by: INTERNAL MEDICINE

## 2018-08-14 PROCEDURE — 99213 OFFICE O/P EST LOW 20 MIN: CPT | Mod: PBBFAC,TXP,25 | Performed by: INTERNAL MEDICINE

## 2018-08-14 PROCEDURE — 74178 CT ABD&PLV WO CNTR FLWD CNTR: CPT | Mod: 26,TXP,, | Performed by: RADIOLOGY

## 2018-08-14 PROCEDURE — 71260 CT THORAX DX C+: CPT | Mod: 26,TXP,, | Performed by: RADIOLOGY

## 2018-08-14 RX ORDER — CARVEDILOL 6.25 MG/1
6.25 TABLET ORAL 2 TIMES DAILY WITH MEALS
COMMUNITY
End: 2018-11-06 | Stop reason: SDUPTHER

## 2018-08-14 RX ORDER — CINACALCET 30 MG/1
30 TABLET, FILM COATED ORAL
COMMUNITY
End: 2018-11-06 | Stop reason: SDUPTHER

## 2018-08-14 RX ORDER — ATORVASTATIN CALCIUM 40 MG/1
40 TABLET, FILM COATED ORAL DAILY
COMMUNITY

## 2018-08-14 RX ADMIN — IOHEXOL 100 ML: 350 INJECTION, SOLUTION INTRAVENOUS at 04:08

## 2018-08-14 NOTE — PROGRESS NOTES
CAREGIVER PLAN UPDATE:  Patient (pt) presented for his appointment with  (SW) accompanied by his friend Trever Marsh to discuss his primary caregiver situation.  Pt advised that his son Gavin Nicholas ([phone]) advised him that he would be present with him on today and would be available to be his primary caretaker; however, Mr. Nicholas was not present.  SW explained the importance of having the caregiver present when attending appointments in an effort for the patient and caregiver to be educated together.  SW further explained the caregiver role to the pt and Mr. Marsh and Mr. Marsh advised he could not take on a primary role; however, he could assist as secondary. SW explained to the pt the importance of securing a primary caregiver and once he was able to do so, he would need to schedule another appointment to meet with the SW along with his identified caregiver.  Pt verified he still resides at his previously stated residence with the same roommate, Jimbo Marsh.  SW advised she would attempt to contact the pt's son Gavin Nicholas to inquire about him being the pt's caregiver.  SW was able to leave a v/m on Mr. Nicholas's cell phone asking for a return call.  SW advised pt he would be deemed unacceptable for transplant until he secured a solid caregiver plan.  SW remains available.

## 2018-08-14 NOTE — PROGRESS NOTES
Subjective:   Initial evaluation of heart transplant candidacy.    HPI:  Mr. Tavarez is a 57 y.o. year old Black or  male who has presents to for evaluation for Liver Kidney Transplant.    He is known to have Hep C Liver Cirrhosis (recently diagnosed early 2018), ESRD (Hypertensive in etiology in 2009, and started HD 2010 MWF), impaired glucose tolerance not on medications and HFpEF. Currently he is in limited functional status with NYHA III (walk up to 20 feet and feels shortness of breath) and this symptoms is persistent whether in dialysis days or not. No orthopnea,occasional PND. No chest pain.     Dobutamine Stress test on 8/2018   Achieved peak heart rate of 103 bpm (64% of predicted) with no significant arrhythmias were present. (he was on Coreg)     Previous work up in [facility]:     ECG  Normal sinus rhythm with possible Left atrial enlargement    PFT 5/2017   Showed mild restrictive disease with moderately reduced DLCO.    RHC 11/2017  Right atrial pressure is elevated. 18  The right ventricular pressure is elevated.74/10  The pulmonary artery pressure is elevated.74/30/44  The wedge pressure is elevated.22  The cardiac output is elevated.6.6 L/min,   index CO 3.47L/min/m2The pulmonary vascular resistance is elevated 3.3      Past Medical History:   Diagnosis Date    CHF (congestive heart failure)     Cirrhosis     Diabetes mellitus     Disorder of kidney and ureter     ESRD    GERD (gastroesophageal reflux disease)      Past Surgical History:   Procedure Laterality Date    AL RT/LT HEART CATHETERS      Cardiac Cath, Right/Left    SINUS SURGERY      1998       Family History   Problem Relation Age of Onset    Post-traumatic stress disorder Mother     Aneurysm Father        Review of Systems   Constitution: Negative for chills and decreased appetite.   HENT: Negative for congestion.    Eyes: Negative for photophobia.   Cardiovascular: Positive for dyspnea on exertion and paroxysmal  "nocturnal dyspnea. Negative for chest pain, irregular heartbeat, leg swelling, near-syncope, orthopnea and palpitations.   Endocrine: Negative for cold intolerance.   Musculoskeletal: Negative for arthritis.   Gastrointestinal: Negative for bloating.   Genitourinary: Negative for bladder incontinence.   Neurological: Negative for aphonia and brief paralysis.       Objective:   Blood pressure (!) 115/57, pulse 61, height 5' 9" (1.753 m), weight 86.5 kg (190 lb 11.2 oz).body mass index is 28.16 kg/m².    Physical Exam   Constitutional: He is oriented to person, place, and time. He appears well-nourished. No distress.   HENT:   Head: Normocephalic.   Eyes: Pupils are equal, round, and reactive to light.   Neck: No JVD present. No thyromegaly present.   Cardiovascular: Normal rate and regular rhythm.   No murmur heard.  Pulmonary/Chest: Effort normal. No respiratory distress. He has no rales.   Abdominal: Soft. He exhibits no distension. There is no tenderness.   Musculoskeletal: He exhibits no edema.   Neurological: He is alert and oriented to person, place, and time.   Vitals reviewed.    Labs:      Chemistry        Component Value Date/Time     07/31/2018 0830    K 3.5 07/31/2018 0830    CL 95 07/31/2018 0830    CO2 27 07/31/2018 0830    BUN 55 (H) 07/31/2018 0830    CREATININE 10.2 (H) 07/31/2018 0830     (H) 07/31/2018 0830        Component Value Date/Time    CALCIUM 8.1 (L) 07/31/2018 0830    ALKPHOS 272 (H) 07/31/2018 0830    AST 58 (H) 07/31/2018 0830    ALT 45 (H) 07/31/2018 0830    BILITOT 1.1 (H) 07/31/2018 0830    ESTGFRAFRICA 5.8 (A) 07/31/2018 0830    EGFRNONAA 5.0 (A) 07/31/2018 0830          No results found for: MG  Lab Results   Component Value Date    WBC 6.17 07/31/2018    HGB 11.7 (L) 07/31/2018    HCT 36.0 (L) 07/31/2018    MCV 99 (H) 07/31/2018     07/31/2018     No results found for: BNP  No results found for this or any previous visit.    Labs were reviewed with the " patient.    Assessment:      1. (HFpEF) heart failure with preserved ejection fraction    2. Pulmonary hypertension    3. Encounter for pre-transplant evaluation for liver transplant    4. Encounter for pre-transplant evaluation for kidney transplant        Plan:     Mr. Corbin Tavarez presented to advance heart failure clinic for pre Liver and Kidney transplant evaluation.    Diagnoses and all orders for this visit:    (HFpEF) heart failure with preserved ejection fraction  Pulmonary hypertension  Encounter for pre-transplant evaluation for liver transplant  Encounter for pre-transplant evaluation for kidney transplant  - Given his elevated PA pressure (PCWP is 22 ) with most likely etiology is WHO group 2 Pulmonary Hypertension (from HFpEF)  - There is no previous studies to assess his coronary anatomy and he achieved only 64% of predicted heart rate (however, he was on Coreg 6.25 mg BID)  - Will repeat Right Heart Catheterization on the day following his HD day, with routine pre RHC labs     UNOS Patient Status  Functional Status: 60% - Requires occasional assistance but is able to care for needs  Physical Capacity: Limited Mobility  Working for Income: Unknown    Discussed with Dr. Lake.   Wallace Curry MD       Agree with plan

## 2018-08-14 NOTE — LETTER
August 14, 2018        Luciana Stephen  [address]  [address]  [place] [address]  Phone: [phone]  Fax: [phone]             [facility] [address]  [facility] [address]  Phone: [phone]   Patient: Corbin Tavarez   MR Number: [id number]   YOB: 1960   Date of Visit: 8/14/2018       Dear Dr. Luciana Stephen    Thank you for referring Corbin Tavarez to me for evaluation. Attached you will find relevant portions of my assessment and plan of care.    If you have questions, please do not hesitate to call me. I look forward to following Corbin Tavarez along with you.    Sincerely,    Wilver Lake MD    Enclosure    If you would like to receive this communication electronically, please contact [email or web address] or [phone] to request [employer] access.    [employer] is a tool which provides read-only access to select patient information with whom you have a relationship. Its easy to use and provides real time access to review your patients record including encounter summaries, notes, results, and demographic information.    If you feel you have received this communication in error or would no longer like to receive these types of communications, please e-mail [email or web address]

## 2018-08-20 ENCOUNTER — TELEPHONE (OUTPATIENT)
Dept: TRANSPLANT | Facility: CLINIC | Age: 58
End: 2018-08-20

## 2018-08-20 NOTE — TELEPHONE ENCOUNTER
----- Message from Coco Eaton sent at 8/20/2018  9:51 AM CDT -----  Returned pt call, but there was no answer. LVM stating I will call him back with the new appt date and time for the kidney txp appt.  ----- Message -----  From: Kerry Corral  Sent: 8/20/2018   8:50 AM  To: [facility] Pre-Liver Transplant Non-Clinical    Needs to resched appts to a Tues or thurs    Pt  Contact [phone]

## 2018-08-28 ENCOUNTER — LAB VISIT (OUTPATIENT)
Dept: LAB | Facility: HOSPITAL | Age: 58
End: 2018-08-28
Payer: MEDICARE

## 2018-08-28 ENCOUNTER — TELEPHONE (OUTPATIENT)
Dept: TRANSPLANT | Facility: CLINIC | Age: 58
End: 2018-08-28

## 2018-08-28 DIAGNOSIS — K74.60 CIRRHOSIS OF LIVER: ICD-10-CM

## 2018-08-28 DIAGNOSIS — I15.1 HYPERTENSION SECONDARY TO OTHER RENAL DISORDERS: ICD-10-CM

## 2018-08-28 DIAGNOSIS — I27.20 PULMONARY HYPERTENSION: ICD-10-CM

## 2018-08-28 DIAGNOSIS — I27.20 PULMONARY HYPERTENSION: Primary | ICD-10-CM

## 2018-08-28 DIAGNOSIS — I12.0 HYPERTENSIVE CHRONIC KIDNEY DISEASE WITH STAGE 5 CHRONIC KIDNEY DISEASE OR END STAGE RENAL DISEASE: ICD-10-CM

## 2018-08-28 DIAGNOSIS — Z01.818 ENCOUNTER FOR PRE-TRANSPLANT EVALUATION FOR LIVER TRANSPLANT: ICD-10-CM

## 2018-08-28 LAB
ALBUMIN SERPL BCP-MCNC: 3.1 G/DL
ALP SERPL-CCNC: 241 U/L
ALT SERPL W/O P-5'-P-CCNC: 40 U/L
ANION GAP SERPL CALC-SCNC: 9 MMOL/L
APTT BLDCRRT: 29.4 SEC
AST SERPL-CCNC: 58 U/L
BASOPHILS # BLD AUTO: 0.07 K/UL
BASOPHILS NFR BLD: 1.3 %
BILIRUB SERPL-MCNC: 1.5 MG/DL
BUN SERPL-MCNC: 37 MG/DL
CALCIUM SERPL-MCNC: 9.3 MG/DL
CHLORIDE SERPL-SCNC: 99 MMOL/L
CO2 SERPL-SCNC: 32 MMOL/L
CREAT SERPL-MCNC: 10.2 MG/DL
DIFFERENTIAL METHOD: ABNORMAL
EOSINOPHIL # BLD AUTO: 0.1 K/UL
EOSINOPHIL NFR BLD: 2.4 %
ERYTHROCYTE [DISTWIDTH] IN BLOOD BY AUTOMATED COUNT: 15 %
EST. GFR  (AFRICAN AMERICAN): 5.8 ML/MIN/1.73 M^2
EST. GFR  (NON AFRICAN AMERICAN): 5 ML/MIN/1.73 M^2
GLUCOSE SERPL-MCNC: 81 MG/DL
HCT VFR BLD AUTO: 39.6 %
HGB BLD-MCNC: 13 G/DL
IMM GRANULOCYTES # BLD AUTO: 0.02 K/UL
IMM GRANULOCYTES NFR BLD AUTO: 0.4 %
INR PPP: 1.1
LYMPHOCYTES # BLD AUTO: 1 K/UL
LYMPHOCYTES NFR BLD: 18.9 %
MCH RBC QN AUTO: 32.8 PG
MCHC RBC AUTO-ENTMCNC: 32.8 G/DL
MCV RBC AUTO: 100 FL
MONOCYTES # BLD AUTO: 1.2 K/UL
MONOCYTES NFR BLD: 21.9 %
NEUTROPHILS # BLD AUTO: 3 K/UL
NEUTROPHILS NFR BLD: 55.1 %
NRBC BLD-RTO: 0 /100 WBC
PLATELET # BLD AUTO: 105 K/UL
PMV BLD AUTO: 11 FL
POTASSIUM SERPL-SCNC: 4.2 MMOL/L
PROT SERPL-MCNC: 8.3 G/DL
PROTHROMBIN TIME: 11.1 SEC
RBC # BLD AUTO: 3.96 M/UL
SODIUM SERPL-SCNC: 140 MMOL/L
WBC # BLD AUTO: 5.44 K/UL

## 2018-08-28 PROCEDURE — 36415 COLL VENOUS BLD VENIPUNCTURE: CPT | Mod: TXP

## 2018-08-28 PROCEDURE — 85730 THROMBOPLASTIN TIME PARTIAL: CPT | Mod: TXP

## 2018-08-28 PROCEDURE — 85610 PROTHROMBIN TIME: CPT | Mod: TXP

## 2018-08-28 PROCEDURE — 85025 COMPLETE CBC W/AUTO DIFF WBC: CPT | Mod: TXP

## 2018-08-28 PROCEDURE — 80053 COMPREHEN METABOLIC PANEL: CPT | Mod: NTX

## 2018-08-28 NOTE — TELEPHONE ENCOUNTER
"SW returned patient's call (ph#[phone]). Patient reported that he was interested in scheduling an appointment in order for SW staff to meet with his son/potential caregiver. Patient reported that he wanted to give his son another chance to participate in the evaluation process. Patient reported that he will "wash his hands" of son if he misses another appointment. SW expressed concern, given that pt's son has missed scheduled appointments for pt's passed evaluations. Patient's son reportedly missed an evaluation when patient was initially being evaluated at [facility]. Patient was provided coordinator's information to contact and schedule appointment. SW remains available.  "

## 2018-08-28 NOTE — TELEPHONE ENCOUNTER
Called Mr. Corbin Tavarez and informed him about the result of his blood work today including CBC and CM, INR and all at stable compared to prior. Also I reminded him on the next scheduled Right Heart Cath on 9/6/2018, voiced understanding.     Suresh Curry MD  Internal Medicine Resident (PGY-III)  Pager: [phone]

## 2018-09-04 ENCOUNTER — CLINICAL SUPPORT (OUTPATIENT)
Dept: INFECTIOUS DISEASES | Facility: CLINIC | Age: 58
End: 2018-09-04
Payer: MEDICARE

## 2018-09-04 ENCOUNTER — OFFICE VISIT (OUTPATIENT)
Dept: INFECTIOUS DISEASES | Facility: CLINIC | Age: 58
End: 2018-09-04
Attending: INTERNAL MEDICINE
Payer: MEDICARE

## 2018-09-04 VITALS
DIASTOLIC BLOOD PRESSURE: 77 MMHG | HEIGHT: 69 IN | SYSTOLIC BLOOD PRESSURE: 159 MMHG | HEART RATE: 76 BPM | BODY MASS INDEX: 28.08 KG/M2 | TEMPERATURE: 99 F | WEIGHT: 189.63 LBS

## 2018-09-04 DIAGNOSIS — Z71.85 IMMUNIZATION COUNSELING: Primary | ICD-10-CM

## 2018-09-04 DIAGNOSIS — Z23 IMMUNIZATION DUE: ICD-10-CM

## 2018-09-04 DIAGNOSIS — Z01.818 PRE-TRANSPLANT EVALUATION FOR CHRONIC LIVER DISEASE: ICD-10-CM

## 2018-09-04 PROCEDURE — G0010 ADMIN HEPATITIS B VACCINE: HCPCS | Mod: 59,TXP,, | Performed by: INTERNAL MEDICINE

## 2018-09-04 PROCEDURE — 90715 TDAP VACCINE 7 YRS/> IM: CPT | Mod: PBBFAC,TXP

## 2018-09-04 PROCEDURE — 99203 OFFICE O/P NEW LOW 30 MIN: CPT | Mod: S$PBB,GC,TXP, | Performed by: INTERNAL MEDICINE

## 2018-09-04 PROCEDURE — 90472 IMMUNIZATION ADMIN EACH ADD: CPT | Mod: TXP,,, | Performed by: INTERNAL MEDICINE

## 2018-09-04 PROCEDURE — 90686 IIV4 VACC NO PRSV 0.5 ML IM: CPT | Mod: PBBFAC,TXP

## 2018-09-04 PROCEDURE — 99214 OFFICE O/P EST MOD 30 MIN: CPT | Mod: PBBFAC,TXP | Performed by: INTERNAL MEDICINE

## 2018-09-04 PROCEDURE — 90471 IMMUNIZATION ADMIN: CPT | Mod: TXP,,, | Performed by: INTERNAL MEDICINE

## 2018-09-04 PROCEDURE — 90632 HEPA VACCINE ADULT IM: CPT | Mod: PBBFAC,TXP

## 2018-09-04 PROCEDURE — 99999 PR PBB SHADOW E&M-EST. PATIENT-LVL IV: CPT | Mod: PBBFAC,GC,TXP, | Performed by: INTERNAL MEDICINE

## 2018-09-04 PROCEDURE — 90746 HEPB VACCINE 3 DOSE ADULT IM: CPT | Mod: PBBFAC,TXP

## 2018-09-04 PROCEDURE — G0008 ADMIN INFLUENZA VIRUS VAC: HCPCS | Mod: 59,TXP,, | Performed by: INTERNAL MEDICINE

## 2018-09-04 PROCEDURE — 90670 PCV13 VACCINE IM: CPT | Mod: PBBFAC,TXP

## 2018-09-04 PROCEDURE — G0009 ADMIN PNEUMOCOCCAL VACCINE: HCPCS | Mod: 59,TXP,, | Performed by: INTERNAL MEDICINE

## 2018-09-04 NOTE — PROGRESS NOTES
Subjective:      Patient ID: Corbin Tavarez is a 57 y.o. male.    Chief Complaint:No chief complaint on file.      History of Present Illness    58 y/o male PMHx: Hep C, ESRD on HD comes to clinic for pre kidney/liver transplant.     Review of Systems   Constitution: Negative for chills, decreased appetite, fever, weakness, malaise/fatigue, night sweats, weight gain and weight loss.   HENT: Negative for congestion, ear pain, hearing loss, hoarse voice, sore throat and tinnitus.    Eyes: Negative for blurred vision, redness and visual disturbance.   Cardiovascular: Positive for chest pain and leg swelling. Negative for palpitations.   Respiratory: Positive for cough and shortness of breath. Negative for hemoptysis and sputum production.    Hematologic/Lymphatic: Negative for adenopathy. Does not bruise/bleed easily.   Skin: Positive for itching. Negative for dry skin, rash and suspicious lesions.   Musculoskeletal: Positive for back pain. Negative for joint pain, myalgias and neck pain.   Gastrointestinal: Negative for abdominal pain, constipation, diarrhea, heartburn, nausea and vomiting.   Genitourinary: Negative for dysuria, flank pain, frequency, hematuria, hesitancy and urgency.   Neurological: Negative for dizziness, headaches, numbness and paresthesias.   Psychiatric/Behavioral: Negative for depression and memory loss. The patient does not have insomnia and is not nervous/anxious.      Objective:   Physical Exam   Constitutional: He is oriented to person, place, and time. He appears well-developed and well-nourished.   HENT:   Head: Normocephalic and atraumatic.   Eyes: Conjunctivae and EOM are normal. Pupils are equal, round, and reactive to light.   Neck: Normal range of motion. Neck supple.   Cardiovascular: Normal rate, regular rhythm and normal heart sounds.   Pulmonary/Chest: Effort normal and breath sounds normal.   Abdominal: Soft. Bowel sounds are normal. He exhibits no distension. There is no  tenderness. There is no rebound.   Musculoskeletal: Normal range of motion. He exhibits no edema, tenderness or deformity.   Neurological: He is alert and oriented to person, place, and time.   Skin: No rash noted. No erythema.     Assessment:       1. Immunization counseling    2. Immunization due    3. Pre-transplant evaluation for chronic liver disease      Prior to evaluation patient had labs done. Is negative for strongyloides, hepatitis B, HIV, syhpilis and TB screen. Had Hepatitis B shot administered at [facility] patient reports first 2 dosis have been recieved. Will recommend to administer last dose today and have Hep B surface antibody done in 1 month to evaluate response to vaccine. Will also recommend for patient to have administered hep A, Prevnar, pneumovax, TDap seasonal flu, and shingrix to have immunizations updated. RTC in 1 month.       Plan:       Immunization counseling  -     Hepatitis B surface antibody; Future; Expected date: 10/04/2018  -     Nursing communication  -     varicella-zoster gE-AS01B, PF, (SHINGRIX, PF,) 50 mcg/0.5 mL injection; Inject 0.5 mLs into the muscle every 8 weeks. for 2 doses  Dispense: 0.5 mL; Refill: 1  -     varicella-zoster gE-AS01B, PF, (SHINGRIX, PF,) 50 mcg/0.5 mL injection; Inject 0.5 mLs into the muscle every 8 weeks. for 2 doses  Dispense: 0.5 mL; Refill: 1  -     varicella-zoster gE-AS01B, PF, (SHINGRIX, PF,) 50 mcg/0.5 mL injection; Inject 0.5 mLs into the muscle every 8 weeks. for 2 doses  Dispense: 0.5 mL; Refill: 1  -     Influenza - Quadrivalent (3 years & older) (PF)  -     Hepatitis A vaccine adult IM; Standing  -     Hepatitis B vaccine adult IM; Future; Expected date: 09/04/2018  -     Hepatitis B vaccine adult IM; Future; Expected date: 09/04/2018  -     Tdap vaccine greater than or equal to 8yo IM; Future; Expected date: 09/04/2018  -     PNEUMOCOCCAL CONJUGATE VACCINE 13-VALENT LESS THAN 6YO & GREATER THAN 49YO IM; Future; Expected date: 09/04/2018  -      Pneumococcal polysaccharide vaccine 23-valent greater than or equal to 3yo subcutaneous/IM; Future

## 2018-09-04 NOTE — PROGRESS NOTES
Mr. Tavarez received initial Hep A, initial HepB, initial Tdap, Flu, and Prevnar 13 and tolerated them well. He left the unit in NAD.

## 2018-09-05 ENCOUNTER — TELEPHONE (OUTPATIENT)
Dept: INFECTIOUS DISEASES | Facility: CLINIC | Age: 58
End: 2018-09-05

## 2018-09-05 NOTE — TELEPHONE ENCOUNTER
----- Message from Mishel Lee sent at 9/5/2018  2:52 PM CDT -----  Contact: Saima zheng/ Jonnathan       [phone]    Dr. Marquez's pt./     Pt is in their office now.    Needs to get the info about what 5 injections pt. Received while in your office yesterday.    Pls call asap .

## 2018-09-06 ENCOUNTER — HOSPITAL ENCOUNTER (OUTPATIENT)
Facility: HOSPITAL | Age: 58
Discharge: HOME | End: 2018-09-06
Attending: INTERNAL MEDICINE | Admitting: INTERNAL MEDICINE
Payer: MEDICARE

## 2018-09-06 PROCEDURE — 25000003 PHARM REV CODE 250: Mod: TXP

## 2018-09-06 PROCEDURE — 93451 RIGHT HEART CATH: CPT | Mod: 26,TXP,, | Performed by: INTERNAL MEDICINE

## 2018-09-06 PROCEDURE — C1894 INTRO/SHEATH, NON-LASER: HCPCS | Mod: TXP

## 2018-09-06 NOTE — DISCHARGE SUMMARY
Admit 09/06/2018  Discharge  09/06/2018  Discharge / Principle diagnosis pulmonary hypertension.    Discharge attending Isaiah Wilkes MD  Hospital course.  Came in for RHC . Tolerated procedure well (see full results in cardiac procedure section).  Results discussed with patient / caregiver.   Discharge condition / disposition Good / home   discharge activity activity as tolerated    Discharge diet diet as tolerated / unchanged from admission  Discharge medication   No current facility-administered medications on file prior to encounter.      Current Outpatient Medications on File Prior to Encounter   Medication Sig    amLODIPine (NORVASC) 10 MG tablet Take 10 mg by mouth once daily.     atorvastatin (LIPITOR) 40 MG tablet Take 40 mg by mouth once daily.    carvedilol (COREG) 6.25 MG tablet Take 6.25 mg by mouth 2 (two) times daily with meals.    cinacalcet (SENSIPAR) 30 MG Tab Take 30 mg by mouth daily with breakfast.    clotrimazole-betamethasone 1-0.05% (LOTRISONE) cream Apply between affected toes twice daily    enalapril (VASOTEC) 20 MG tablet Take 20 mg by mouth once daily.     ergocalciferol (VITAMIN D2) 50,000 unit Cap Take 50,000 Units by mouth every 7 days.    folic acid/vit B complex and C (MERLIN-ISHA ORAL) Take by mouth.    omeprazole (PRILOSEC) 20 MG capsule Take 20 mg by mouth once daily.     pravastatin (PRAVACHOL) 20 MG tablet Take 20 mg by mouth once daily.     protein supplement Pack Take by mouth 3 (three) times daily.     sevelamer HCl (RENAGEL) 800 MG Tab Take 3,200 mg by mouth 3 (three) times daily with meals.     sildenafil (REVATIO) 20 mg Tab Take 10 mg by mouth daily as needed.      Followup in clinic as scheduled

## 2018-09-06 NOTE — H&P
Subjective:      Corbin Tavarez is a 57 y.o. male with ELSD / ERSD who needs RHC  for evaluation of PA pressures prior to combined liver / kindey transplant.  Currently able to lay flat.      Past Medical History:   Diagnosis Date    CHF (congestive heart failure)     Cirrhosis     Diabetes mellitus     Disorder of kidney and ureter     ESRD    GERD (gastroesophageal reflux disease)      Past Surgical History:   Procedure Laterality Date    NM RT/LT HEART CATHETERS      Cardiac Cath, Right/Left    SINUS SURGERY      1998     Social History     Socioeconomic History    Marital status: Single     Spouse name: Not on file    Number of children: Not on file    Years of education: Not on file    Highest education level: Not on file   Social Needs    Financial resource strain: Not on file    Food insecurity - worry: Not on file    Food insecurity - inability: Not on file    Transportation needs - medical: Not on file    Transportation needs - non-medical: Not on file   Occupational History    Not on file   Tobacco Use    Smoking status: Former Smoker     Types: Cigarettes    Smokeless tobacco: Never Used    Tobacco comment: stopped smoking about 7 months ago   Substance and Sexual Activity    Alcohol use: No     Comment: patient denies    Drug use: No     Comment: patient denies    Sexual activity: Not on file   Other Topics Concern    Not on file   Social History Narrative    Not on file     Family History   Problem Relation Age of Onset    Post-traumatic stress disorder Mother     Aneurysm Father            Other significant clinical information:  Review of patient's allergies indicates:  No Known Allergies  No current facility-administered medications on file prior to encounter.      Current Outpatient Medications on File Prior to Encounter   Medication Sig    amLODIPine (NORVASC) 10 MG tablet Take 10 mg by mouth once daily.     atorvastatin (LIPITOR) 40 MG tablet Take 40 mg by mouth once  daily.    carvedilol (COREG) 6.25 MG tablet Take 6.25 mg by mouth 2 (two) times daily with meals.    cinacalcet (SENSIPAR) 30 MG Tab Take 30 mg by mouth daily with breakfast.    clotrimazole-betamethasone 1-0.05% (LOTRISONE) cream Apply between affected toes twice daily    enalapril (VASOTEC) 20 MG tablet Take 20 mg by mouth once daily.     ergocalciferol (VITAMIN D2) 50,000 unit Cap Take 50,000 Units by mouth every 7 days.    folic acid/vit B complex and C (MERLIN-ISHA ORAL) Take by mouth.    omeprazole (PRILOSEC) 20 MG capsule Take 20 mg by mouth once daily.     pravastatin (PRAVACHOL) 20 MG tablet Take 20 mg by mouth once daily.     protein supplement Pack Take by mouth 3 (three) times daily.     sevelamer HCl (RENAGEL) 800 MG Tab Take 3,200 mg by mouth 3 (three) times daily with meals.     sildenafil (REVATIO) 20 mg Tab Take 10 mg by mouth daily as needed.             Objective:      Physical Exam      General Appearance:    Alert, cooperative, no distress, appears stated age   Head:    Normocephalic, without obvious abnormality, atraumatic   Eyes:    PERRL, conjunctiva/corneas clear, EOM's intact, fundi     benign, both eyes        Ears:    Normal TM's and external ear canals, both ears   Nose:   Nares normal, septum midline, mucosa normal, no drainage    or sinus tenderness   Throat:   Lips, mucosa, and tongue normal; teeth and gums normal   Neck:   Supple, symmetrical, trachea midline, no adenopathy;        thyroid:  No enlargement/tenderness/nodules; no carotid    bruit or JVD   Back:     Symmetric, no curvature, ROM normal, no CVA tenderness   Lungs:     Clear to auscultation bilaterally, respirations unlabored   Chest wall:    No tenderness or deformity   Heart:    Regular rate and rhythm, S1 and S2 normal, no murmur, rub   or gallop   Abdomen:     Soft, non-tender, bowel sounds active all four quadrants,     no masses, no organomegaly   Genitalia:    Normal male without lesion, discharge or  tenderness   Rectal:    Normal tone, normal prostate, no masses or tenderness;    guaiac negative stool   Extremities:   Extremities normal, atraumatic, no cyanosis or edema   Pulses:   2+ and symmetric all extremities   Skin:   Skin color, texture, turgor normal, no rashes or lesions   Lymph nodes:   Cervical, supraclavicular, and axillary nodes normal   Neurologic:   CNII-XII intact. Normal strength, sensation and reflexes       throughout         Lab Review   Lab Results   Component Value Date    WBC 6.41 09/06/2018    HGB 11.9 (L) 09/06/2018    HCT 36.8 (L) 09/06/2018     (H) 09/06/2018     (L) 09/06/2018     Lab Results   Component Value Date    INR 1.0 09/06/2018    INR 1.1 08/28/2018    INR 1.1 07/31/2018           Assessment:       Plan:     I have explained the risks, benefits, and alternatives of the procedure in detail.  The patient expresses understanding and all questions have been answered.  The patient agrees to the proceed as planned.  RHC via RIJ if ultrasound shows patency of RIJ If not will use LIJ  Micropuncture access needle will be used to minimize bleeding risk.

## 2018-09-06 NOTE — DISCHARGE INSTRUCTIONS
Subjective:   AFTER THE PROCEDURE:  -You may remove the bandage in 24 hours and wash with soap and water.  -You may shower, but do not soak in a tub for three days.   PRECAUTIONS FOR THE NEXT 24 HOURS:  -If you need to cough, sneeze, have a bowel movement, or bear down, hold pressure over your bandage.  -Do not  anything heavier than a gallon of milk(about 5 pounds)  -Avoid excessive bending over.  SYMPTOMS TO WATCH FOR AND REPORT TO YOUR DOCTOR:  -BLEEDING: hold pressure over the site until bleeding stops. Proceed to Emergency Room by ambulance (do not drive yourself) if unable to stop bleeding. Notify your doctor.  -HEMATOMA(hard bruise under the skin): Kuldeep around the bruise if one develops. Call your doctor if it increases in size or if you have difficulty talking, swallowing, breathing or anything unusual.  SIGNS OF INFECTION:Fever (temperature over 100.5 F), pus or redness  -RASH  -CHEST PAIN OR SHORTNESS OF BREATH    You may call you coordinator in the Heart Failure/Heart Transplant/Pulmonary Hypertension Clinic at [phone] during normal business hours(Monday through Friday from 8 A.M. to 5 P.M.) After hours, call the Heart Transplant Service doctor on call at [phone].

## 2018-09-07 ENCOUNTER — TELEPHONE (OUTPATIENT)
Dept: TRANSPLANT | Facility: CLINIC | Age: 58
End: 2018-09-07

## 2018-09-07 NOTE — TELEPHONE ENCOUNTER
----- Message from Quynh Pérez sent at 9/7/2018 12:27 PM CDT -----  Contact: Patient  Oct 9th at 9 am. 1st available for us. I booked it.

## 2018-09-07 NOTE — TELEPHONE ENCOUNTER
Called pt to notify him of new appt date/time for KTM appt. Understanding expressed. Appt info repeated back by pt. No other questions at this time.

## 2018-09-13 ENCOUNTER — TELEPHONE (OUTPATIENT)
Dept: TRANSPLANT | Facility: CLINIC | Age: 58
End: 2018-09-13

## 2018-09-13 ENCOUNTER — DOCUMENTATION ONLY (OUTPATIENT)
Dept: TRANSPLANT | Facility: CLINIC | Age: 58
End: 2018-09-13

## 2018-09-13 DIAGNOSIS — Z01.818 ENCOUNTER FOR PRE-TRANSPLANT EVALUATION FOR LIVER TRANSPLANT: ICD-10-CM

## 2018-09-13 DIAGNOSIS — N18.6 ESRD (END STAGE RENAL DISEASE): ICD-10-CM

## 2018-09-13 DIAGNOSIS — B18.2 CHRONIC HEPATITIS C WITH CIRRHOSIS: Primary | ICD-10-CM

## 2018-09-13 DIAGNOSIS — K74.60 CHRONIC HEPATITIS C WITH CIRRHOSIS: Primary | ICD-10-CM

## 2018-09-13 NOTE — TELEPHONE ENCOUNTER
"----- Message from Coco Eaton sent at 9/13/2018 11:43 AM CDT -----  Called and sp to pt about [facility] his f/u appt with Dr. Martinez post his [facility]. Mildred'ed him for 10/9 @ 3pm and mildred his u/s liver for 10/2 @ 8am. Will mail out new copy of his appts. Will let nurse know  ------------------------------------------------------------------------------------------  f/u cards   Received: Today   Message Contents   Andres Otero RN sent to Coco Eaton         Pt needs f/u with Lake post [facility] Recommendations       1. Follow up with Dr. Wilver Lake."     Pt also needs his abdominal US - I think he was late to eval that day & missed it. Order in.       "

## 2018-09-28 ENCOUNTER — TELEPHONE (OUTPATIENT)
Dept: INFECTIOUS DISEASES | Facility: CLINIC | Age: 58
End: 2018-09-28

## 2018-09-28 NOTE — TELEPHONE ENCOUNTER
Called pt, got no answer. Left a message to call and reschedule appt that was originally scheduled for 10/4/2018.

## 2018-10-16 DIAGNOSIS — K74.60 CHRONIC HEPATITIS C WITH CIRRHOSIS: Primary | ICD-10-CM

## 2018-10-16 DIAGNOSIS — N18.6 ESRD (END STAGE RENAL DISEASE): ICD-10-CM

## 2018-10-16 DIAGNOSIS — Z76.82 ORGAN TRANSPLANT CANDIDATE: ICD-10-CM

## 2018-10-16 DIAGNOSIS — B18.2 CHRONIC HEPATITIS C WITH CIRRHOSIS: Primary | ICD-10-CM

## 2018-10-19 ENCOUNTER — TELEPHONE (OUTPATIENT)
Dept: TRANSPLANT | Facility: CLINIC | Age: 58
End: 2018-10-19

## 2018-10-19 NOTE — TELEPHONE ENCOUNTER
----- Message from Coco Angelito sent at 10/19/2018 12:03 PM CDT -----  Regarding: FW: Reschedule  Called pt and went over appts nba'ed. Told him that we are stilling waiting on his kidney txp appt because we need to work around his dialysis nba. Will mail him appt that we have nba'ed so far.  ----- Message -----  From: Andres Otero RN  Sent: 10/16/2018  11:58 AM  To: Coco Eaton  Subject: Reschedule                                       Pt no showed/canceled most recent appts. Needs them rescheduled.    [facility] - for post RHC follow-up per his note  Nephrology - for a liver/kidney consult  ID - one month f/u per their note in September  MELD labs with [facility]  Ultrasound

## 2018-11-06 ENCOUNTER — TELEPHONE (OUTPATIENT)
Dept: TRANSPLANT | Facility: CLINIC | Age: 58
End: 2018-11-06

## 2018-11-06 ENCOUNTER — OFFICE VISIT (OUTPATIENT)
Dept: TRANSPLANT | Facility: CLINIC | Age: 58
End: 2018-11-06
Payer: MEDICARE

## 2018-11-06 ENCOUNTER — CLINICAL SUPPORT (OUTPATIENT)
Dept: INFECTIOUS DISEASES | Facility: CLINIC | Age: 58
End: 2018-11-06
Payer: MEDICARE

## 2018-11-06 ENCOUNTER — HOSPITAL ENCOUNTER (OUTPATIENT)
Dept: RADIOLOGY | Facility: HOSPITAL | Age: 58
Discharge: HOME | End: 2018-11-06
Attending: INTERNAL MEDICINE
Payer: MEDICARE

## 2018-11-06 ENCOUNTER — TELEPHONE (OUTPATIENT)
Dept: PHARMACY | Facility: CLINIC | Age: 58
End: 2018-11-06

## 2018-11-06 VITALS
DIASTOLIC BLOOD PRESSURE: 87 MMHG | SYSTOLIC BLOOD PRESSURE: 189 MMHG | HEART RATE: 82 BPM | HEIGHT: 69 IN | BODY MASS INDEX: 27.11 KG/M2 | WEIGHT: 183 LBS

## 2018-11-06 DIAGNOSIS — Z01.818 ENCOUNTER FOR PRE-TRANSPLANT EVALUATION FOR KIDNEY TRANSPLANT: Primary | ICD-10-CM

## 2018-11-06 DIAGNOSIS — R18.8 OTHER ASCITES: Chronic | ICD-10-CM

## 2018-11-06 DIAGNOSIS — Z76.82 ORGAN TRANSPLANT CANDIDATE: ICD-10-CM

## 2018-11-06 DIAGNOSIS — K74.60 CHRONIC HEPATITIS C WITH CIRRHOSIS: ICD-10-CM

## 2018-11-06 DIAGNOSIS — N18.6 ESRD (END STAGE RENAL DISEASE): ICD-10-CM

## 2018-11-06 DIAGNOSIS — B18.2 CHRONIC HEPATITIS C WITH CIRRHOSIS: ICD-10-CM

## 2018-11-06 DIAGNOSIS — Z01.818 ENCOUNTER FOR PRE-TRANSPLANT EVALUATION FOR LIVER TRANSPLANT: ICD-10-CM

## 2018-11-06 DIAGNOSIS — I50.30 (HFPEF) HEART FAILURE WITH PRESERVED EJECTION FRACTION: ICD-10-CM

## 2018-11-06 DIAGNOSIS — I27.20 PULMONARY HYPERTENSION: ICD-10-CM

## 2018-11-06 PROCEDURE — 90732 PPSV23 VACC 2 YRS+ SUBQ/IM: CPT | Mod: PBBFAC,TXP

## 2018-11-06 PROCEDURE — 99215 OFFICE O/P EST HI 40 MIN: CPT | Mod: S$PBB,TXP,, | Performed by: INTERNAL MEDICINE

## 2018-11-06 PROCEDURE — 93975 VASCULAR STUDY: CPT | Mod: 26,TXP,, | Performed by: RADIOLOGY

## 2018-11-06 PROCEDURE — 93975 VASCULAR STUDY: CPT | Mod: TC,TXP

## 2018-11-06 PROCEDURE — 99213 OFFICE O/P EST LOW 20 MIN: CPT | Mod: PBBFAC,TXP,25 | Performed by: INTERNAL MEDICINE

## 2018-11-06 PROCEDURE — 99999 PR PBB SHADOW E&M-EST. PATIENT-LVL III: CPT | Mod: PBBFAC,TXP,, | Performed by: INTERNAL MEDICINE

## 2018-11-06 RX ORDER — AMLODIPINE BESYLATE 10 MG/1
10 TABLET ORAL DAILY
Qty: 30 TABLET | Refills: 11 | Status: SHIPPED | OUTPATIENT
Start: 2018-11-06

## 2018-11-06 RX ORDER — OMEPRAZOLE 20 MG/1
20 CAPSULE, DELAYED RELEASE ORAL DAILY
Qty: 30 CAPSULE | Refills: 11 | Status: SHIPPED | OUTPATIENT
Start: 2018-11-06

## 2018-11-06 RX ORDER — CINACALCET 30 MG/1
30 TABLET, FILM COATED ORAL
Qty: 30 TABLET | Refills: 11 | Status: SHIPPED | OUTPATIENT
Start: 2018-11-06

## 2018-11-06 RX ORDER — CARVEDILOL 6.25 MG/1
6.25 TABLET ORAL 2 TIMES DAILY WITH MEALS
Qty: 30 TABLET | Refills: 11 | Status: SHIPPED | OUTPATIENT
Start: 2018-11-06

## 2018-11-06 RX ORDER — PRAVASTATIN SODIUM 20 MG/1
20 TABLET ORAL DAILY
Qty: 90 TABLET | Refills: 3 | Status: SHIPPED | OUTPATIENT
Start: 2018-11-06 | End: 2019-11-06

## 2018-11-06 RX ORDER — SILDENAFIL CITRATE 20 MG/1
20 TABLET ORAL DAILY PRN
Qty: 30 TABLET | Refills: 11 | Status: SHIPPED | OUTPATIENT
Start: 2018-11-06 | End: 2018-11-15 | Stop reason: DRUGHIGH

## 2018-11-06 NOTE — PROGRESS NOTES
Subjective:   Initial evaluation of heart transplant candidacy.    HPI:  Mr. Tavarez is a 58 y.o. year old Black or  male who has presents to for evaluation for Liver Kidney Transplant.    He is known to have Hep C Liver Cirrhosis (recently diagnosed early 2018), ESRD (Hypertensive in etiology in 2009, and started HD 2010 MWF), impaired glucose tolerance not on medications and HFpEF. Currently he is in limited functional status with NYHA III (walk up to 20 feet and feels shortness of breath) and this symptoms is persistent whether in dialysis days or not. No orthopnea,occasional PND. No chest pain.     Dobutamine Stress test on 8/2018   Achieved peak heart rate of 103 bpm (64% of predicted) with no significant arrhythmias were present. (he was on Coreg)     Previous work up in [facility]:     ECG  Normal sinus rhythm with possible Left atrial enlargement    PFT 5/2017   Showed mild restrictive disease with moderately reduced DLCO.    RHC 11/2017  Right atrial pressure is elevated. 18  The right ventricular pressure is elevated.74/10  The pulmonary artery pressure is elevated.74/30/44  The wedge pressure is elevated.22  The cardiac output is elevated.6.6 L/min,   index CO 3.47L/min/m2The pulmonary vascular resistance is elevated 3.3      New right heart cath  RA: 10/9 (6)  RV: 51/0  RVEDP: 6     PA: 50/15 (30)  PW: 21/23 (18)  PW_SAT: 95  PA_SAT: 73  SVC_SAT: 76  FICKCO: 6.0900  PVR: 158.0000  SVR: 1354.0000    Past Medical History:   Diagnosis Date    CHF (congestive heart failure)     Cirrhosis     Diabetes mellitus     Disorder of kidney and ureter     ESRD    GERD (gastroesophageal reflux disease)      Past Surgical History:   Procedure Laterality Date    [place] RT/LT HEART CATHETERS      Cardiac Cath, Right/Left    SINUS SURGERY      1998       Family History   Problem Relation Age of Onset    Post-traumatic stress disorder Mother     Aneurysm Father        Review of Systems   Constitution:  "Negative for chills and decreased appetite.   HENT: Negative for congestion.    Eyes: Negative for photophobia.   Cardiovascular: Positive for dyspnea on exertion. Negative for chest pain, irregular heartbeat, leg swelling, near-syncope, orthopnea, palpitations and paroxysmal nocturnal dyspnea.   Endocrine: Negative for cold intolerance.   Musculoskeletal: Negative for arthritis.   Gastrointestinal: Negative for bloating.   Genitourinary: Negative for bladder incontinence.   Neurological: Negative for aphonia and brief paralysis.       Objective:   Blood pressure (!) 189/87, pulse 82, height 5' 9" (1.753 m), weight 83 kg (182 lb 15.7 oz).body mass index is 27.02 kg/m².    Physical Exam   Constitutional: He is oriented to person, place, and time. He appears well-nourished. No distress.   HENT:   Head: Normocephalic.   Eyes: Pupils are equal, round, and reactive to light.   Neck: No JVD present. No thyromegaly present.   Cardiovascular: Normal rate and regular rhythm.   No murmur heard.  Pulmonary/Chest: Effort normal. No respiratory distress. He has no rales.   Abdominal: Soft. He exhibits no distension. There is no tenderness.   Musculoskeletal: He exhibits no edema.   Neurological: He is alert and oriented to person, place, and time.   Vitals reviewed.    Labs:      Chemistry        Component Value Date/Time     09/06/2018 1210    K 3.8 09/06/2018 1210    CL 97 09/06/2018 1210    CO2 28 09/06/2018 1210    BUN 43 (H) 09/06/2018 1210    CREATININE 10.5 (H) 09/06/2018 1210     (H) 09/06/2018 1210        Component Value Date/Time    CALCIUM 9.7 09/06/2018 1210    ALKPHOS 212 (H) 09/06/2018 1210    AST 65 (H) 09/06/2018 1210    ALT 46 (H) 09/06/2018 1210    BILITOT 1.2 (H) 09/06/2018 1210    ESTGFRAFRICA 5.6 (A) 09/06/2018 1210    EGFRNONAA 4.8 (A) 09/06/2018 1210          No results found for: MG  Lab Results   Component Value Date    WBC 6.41 09/06/2018    HGB 11.9 (L) 09/06/2018    HCT 36.8 (L) " 09/06/2018     (H) 09/06/2018     (L) 09/06/2018     No results found for: BNP  No results found for this or any previous visit.    Labs were reviewed with the patient.    Assessment:      1. Encounter for pre-transplant evaluation for kidney transplant    2. ESRD (end stage renal disease)    3. Pulmonary hypertension    4. (HFpEF) heart failure with preserved ejection fraction    5. Chronic hepatitis C with cirrhosis    6. Encounter for pre-transplant evaluation for liver transplant    7. Other ascites        Plan:     Mr. Corbin Tavarez presented to advance heart failure clinic for pre Liver and Kidney transplant evaluation.    Diagnoses and all orders for this visit:    (HFpEF) heart failure with preserved ejection fraction  Pulmonary hypertension  Encounter for pre-transplant evaluation for liver transplant  Encounter for pre-transplant evaluation for kidney transplant  - Given his elevated PA pressure (PCWP is 22 ) with most likely etiology is WHO group 2 Pulmonary Hypertension (from HFpEF)  - There is no previous studies to assess his coronary anatomy and he achieved only 64% of predicted heart rate (however, he was on Coreg 6.25 mg BID)  RIght heart cath mild PA diastolic dysfunction    -RA: 10/9 (6)  RV: 51/0  RVEDP: 6   PA: 50/15 (30)  PW: 21/23 (18)  PW_SAT: 95  PA_SAT: 73  SVC_SAT: 76  FICKCO: 6.0900  PVR: 158.0000  SVR: 1354.0000    Low to medium risk for kidney/liver transplant PA is lower and reflects diastolic dysfunction very common on kidney disease    RTC 1 year    UNOS Patient Status  Functional Status: 60% - Requires occasional assistance but is able to care for needs  Physical Capacity: Limited Mobility  Working for Income: Unknown    Discussed with Dr. Lake.   Wilver Lake MD       Agree with plan

## 2018-11-06 NOTE — TELEPHONE ENCOUNTER
Informed patient of receipt of sildenafil Rx order and need for prior authorization.  OSP will follow up once determination received from insurance.

## 2018-11-06 NOTE — TELEPHONE ENCOUNTER
----- Message from Mishel Brady MA sent at 11/6/2018 12:11 PM CST -----  Contact: Anil- [facility] [phone]  He is calling to tell you that med Sensipar 30 mg needs an auth. Please call. He said that he sent paper for me.        Spoke to the pharmacist (Anil) the pt needs to contact his prescribing doctor to his sensipar for prior authorization.No need to prior authorize by Dr. Lake.Thank you.

## 2018-11-09 NOTE — TELEPHONE ENCOUNTER
Documentation Only:    Faxed Prior Authorization form and supporting documentation for Sildenafil to insurance on 11/09/2018.

## 2018-11-12 NOTE — TELEPHONE ENCOUNTER
Documentation Only:    Prior Authorization for Sildenafil has been approved for 6 months.    Approval dates:  11/09/2018 through 05/09/2019    Patient co-pay:  $0.00

## 2018-11-15 RX ORDER — SILDENAFIL CITRATE 20 MG/1
20 TABLET ORAL 3 TIMES DAILY
Qty: 90 TABLET | Refills: 11 | Status: SHIPPED | OUTPATIENT
Start: 2018-11-15 | End: 2018-12-07

## 2018-11-19 ENCOUNTER — TELEPHONE (OUTPATIENT)
Dept: PHARMACY | Facility: CLINIC | Age: 58
End: 2018-11-19

## 2018-12-06 NOTE — TELEPHONE ENCOUNTER
3rd attempt: LM (phone went straight to ) to inform patient of Sildenafil approval ($0 copay -004), offer consultation, and arrange initial pickup or shipment. [facility] to msg. Msg also sent to [facility] to inform of inability to reach patient. Postcard also mailed. TTN

## 2018-12-07 ENCOUNTER — TELEPHONE (OUTPATIENT)
Dept: TRANSPLANT | Facility: CLINIC | Age: 58
End: 2018-12-07

## 2018-12-07 NOTE — TELEPHONE ENCOUNTER
Hi Dr. Lake + Staff,     We have been trying to reach this patient since early November in regards to his Sildenafil approval with no success. I've sent him a postcard to call us, but if he is in touch with your office, please have him reach out to us if sildenafil is still appropriate to initiate.     Thanks,   Lilian Jaffe, PharmD, CSP   Clinical Pharmacist   [facility]   P: [phone]   Toll Free: [phone]   Monday thru Friday 7am-7pm   F: [phone]

## 2018-12-13 ENCOUNTER — TELEPHONE (OUTPATIENT)
Dept: TRANSPLANT | Facility: CLINIC | Age: 58
End: 2018-12-13

## 2018-12-13 NOTE — TELEPHONE ENCOUNTER
Called patient to inform him of his upcoming transplant nephrology appointment with Dr. Santiago on 12/27 at 0800. No answer. Left detailed voicemail.

## 2019-01-08 ENCOUNTER — TELEPHONE (OUTPATIENT)
Dept: TRANSPLANT | Facility: CLINIC | Age: 59
End: 2019-01-08

## 2019-01-08 NOTE — TELEPHONE ENCOUNTER
Called to check in with patient. He has missed his transplant nephrology appointment for initial evaluation of [facility] candidacy again. No answer. Left detailed VM requesting a call back to schedule.    Will send letter to request he call to schedule & will notify pt's hepatologist.

## 2020-01-21 ENCOUNTER — TELEPHONE (OUTPATIENT)
Dept: TRANSPLANT | Facility: CLINIC | Age: 60
End: 2020-01-21

## 2020-01-21 NOTE — TELEPHONE ENCOUNTER
Upon review of chart, patient has not responded to calls or letters for scheduling KTM. Pt canceled/no-showed for multiple liver-kidney evaluation appts. Pt lost to follow-up. Liver kidney transplant episodes closed.

## 2021-06-18 NOTE — PROGRESS NOTES
Dialysis Adherence:     SW received a dialysis adherence form which indicates over last three months that the patient has had 5 AMAs, 3 no-shows, and has an intact PTH of 1823. Form also indicates that pt utilizes cabs for transportation, and was recently released from alf. TYSON followed up with this information with Caridad, dialysis unit SW who reports that pt may be a better candidate once more settled. She reports that he is slowly getting resources, still has family, support, but does not have any medication coverage at this time because he missed open enrollment. Caridad also reports that pt's new address is [address] [place] [id number] and his number is [phone].    Psychosocial Suitability: Patient presents as an unacceptable candidate for [facility] at this time. Based on psychosocial risk factors, patient presents as high risk, due to no medication coverage, non-adherence to dialysis, and better stability in pt's personal life..    
Not applicable

## 2024-04-05 NOTE — LETTER
November 6, 2018        Luciana Stephen  [address]  [address]  [place] [address]  Phone: [phone]  Fax: [phone]             [facility] [address]  [facility] [address]  Phone: [phone]   Patient: Corbin Tavarez   MR Number: [id number]   YOB: 1960   Date of Visit: 11/6/2018       Dear Dr. Luciana Stephen    Thank you for referring Corbin Tavarez to me for evaluation. Attached you will find relevant portions of my assessment and plan of care.    If you have questions, please do not hesitate to call me. I look forward to following Corbin Tavarez along with you.    Sincerely,    Wilver Lake MD    Enclosure    If you would like to receive this communication electronically, please contact [email or web address] or [phone] to request [facility] Link access.    [facility] Link is a tool which provides read-only access to select patient information with whom you have a relationship. Its easy to use and provides real time access to review your patients record including encounter summaries, notes, results, and demographic information.    If you feel you have received this communication in error or would no longer like to receive these types of communications, please e-mail [email or web address]       
External electronic FM
